# Patient Record
Sex: FEMALE | Race: WHITE | Employment: UNEMPLOYED | URBAN - METROPOLITAN AREA
[De-identification: names, ages, dates, MRNs, and addresses within clinical notes are randomized per-mention and may not be internally consistent; named-entity substitution may affect disease eponyms.]

---

## 2017-04-24 ENCOUNTER — OFFICE VISIT (OUTPATIENT)
Dept: FAMILY MEDICINE CLINIC | Age: 37
End: 2017-04-24
Payer: MEDICAID

## 2017-04-24 ENCOUNTER — HOSPITAL ENCOUNTER (OUTPATIENT)
Age: 37
Setting detail: SPECIMEN
Discharge: HOME | End: 2017-04-24
Payer: MEDICAID

## 2017-04-24 VITALS
BODY MASS INDEX: 41.15 KG/M2 | WEIGHT: 247 LBS | HEIGHT: 65 IN | RESPIRATION RATE: 16 BRPM | TEMPERATURE: 97.7 F | HEART RATE: 96 BPM | DIASTOLIC BLOOD PRESSURE: 86 MMHG | SYSTOLIC BLOOD PRESSURE: 118 MMHG

## 2017-04-24 DIAGNOSIS — Z01.419 ENCOUNTER FOR GYNECOLOGICAL EXAMINATION WITHOUT ABNORMAL FINDING: ICD-10-CM

## 2017-04-24 DIAGNOSIS — Z00.00 ROUTINE PHYSICAL EXAMINATION: Primary | ICD-10-CM

## 2017-04-24 DIAGNOSIS — Z11.4 SCREENING FOR HIV (HUMAN IMMUNODEFICIENCY VIRUS): ICD-10-CM

## 2017-04-24 PROCEDURE — 99395 PREV VISIT EST AGE 18-39: CPT | Performed by: NURSE PRACTITIONER

## 2017-04-24 RX ORDER — CHOLECALCIFEROL (VITAMIN D3) 125 MCG
5 CAPSULE ORAL NIGHTLY PRN
COMMUNITY
Start: 2017-04-24 | End: 2018-08-17 | Stop reason: ALTCHOICE

## 2017-04-24 RX ORDER — BUTALBITAL, ACETAMINOPHEN AND CAFFEINE 300; 40; 50 MG/1; MG/1; MG/1
1 CAPSULE ORAL PRN
COMMUNITY
Start: 2017-04-24 | End: 2021-07-21

## 2017-04-24 RX ORDER — FLUOXETINE HYDROCHLORIDE 40 MG/1
1 CAPSULE ORAL DAILY
Refills: 0 | COMMUNITY
Start: 2017-03-27 | End: 2018-08-17 | Stop reason: DRUGHIGH

## 2017-04-24 ASSESSMENT — ENCOUNTER SYMPTOMS
RHINORRHEA: 0
COUGH: 0
VOMITING: 0
DIARRHEA: 0
EYE DISCHARGE: 0
EYE REDNESS: 0
ABDOMINAL PAIN: 0
CHEST TIGHTNESS: 0
WHEEZING: 0
SHORTNESS OF BREATH: 0
SINUS PRESSURE: 0
BACK PAIN: 1
CONSTIPATION: 0

## 2017-04-26 LAB
HPV SAMPLE: NORMAL
HPV SOURCE: NORMAL
HPV, GENOTYPE 16: NOT DETECTED
HPV, GENOTYPE 18: NOT DETECTED
HPV, HIGH RISK OTHER: NOT DETECTED
HPV, INTERPRETATION: NORMAL

## 2017-04-28 LAB — CYTOLOGY REPORT: NORMAL

## 2017-09-08 ENCOUNTER — OFFICE VISIT (OUTPATIENT)
Dept: FAMILY MEDICINE CLINIC | Age: 37
End: 2017-09-08
Payer: MEDICAID

## 2017-09-08 VITALS
SYSTOLIC BLOOD PRESSURE: 114 MMHG | DIASTOLIC BLOOD PRESSURE: 72 MMHG | RESPIRATION RATE: 16 BRPM | TEMPERATURE: 99 F | HEART RATE: 100 BPM | BODY MASS INDEX: 42.93 KG/M2 | WEIGHT: 254 LBS

## 2017-09-08 DIAGNOSIS — J40 BRONCHITIS: Primary | ICD-10-CM

## 2017-09-08 PROBLEM — M50.20 HERNIATION OF INTERVERTEBRAL DISC OF CERVICAL REGION: Status: ACTIVE | Noted: 2017-08-18

## 2017-09-08 PROCEDURE — 99213 OFFICE O/P EST LOW 20 MIN: CPT | Performed by: NURSE PRACTITIONER

## 2017-09-08 RX ORDER — AZITHROMYCIN 250 MG/1
TABLET, FILM COATED ORAL
Qty: 6 TABLET | Refills: 0 | Status: SHIPPED | OUTPATIENT
Start: 2017-09-08 | End: 2017-09-18

## 2017-09-08 RX ORDER — PREDNISONE 20 MG/1
TABLET ORAL
Refills: 0 | COMMUNITY
Start: 2017-09-05 | End: 2017-09-11

## 2017-09-08 RX ORDER — IBUPROFEN 800 MG/1
1 TABLET ORAL EVERY 6 HOURS PRN
Refills: 0 | COMMUNITY
Start: 2017-09-05 | End: 2018-09-08

## 2017-09-08 RX ORDER — CYCLOBENZAPRINE HCL 10 MG
1 TABLET ORAL 3 TIMES DAILY PRN
Refills: 0 | COMMUNITY
Start: 2017-06-30 | End: 2018-05-08 | Stop reason: ALTCHOICE

## 2017-09-08 ASSESSMENT — ENCOUNTER SYMPTOMS
SHORTNESS OF BREATH: 0
SINUS PRESSURE: 1
ABDOMINAL PAIN: 0
NAUSEA: 0
CONSTIPATION: 0
COUGH: 1
RHINORRHEA: 1
WHEEZING: 0
EYES NEGATIVE: 1
VOICE CHANGE: 1
SORE THROAT: 1
TROUBLE SWALLOWING: 1
DIARRHEA: 0

## 2017-10-13 ENCOUNTER — OFFICE VISIT (OUTPATIENT)
Dept: FAMILY MEDICINE CLINIC | Age: 37
End: 2017-10-13
Payer: MEDICAID

## 2017-10-13 VITALS
RESPIRATION RATE: 16 BRPM | SYSTOLIC BLOOD PRESSURE: 110 MMHG | WEIGHT: 253 LBS | TEMPERATURE: 98 F | HEART RATE: 84 BPM | BODY MASS INDEX: 42.76 KG/M2 | DIASTOLIC BLOOD PRESSURE: 76 MMHG

## 2017-10-13 DIAGNOSIS — L30.9 DERMATITIS: Primary | ICD-10-CM

## 2017-10-13 PROCEDURE — 99213 OFFICE O/P EST LOW 20 MIN: CPT | Performed by: NURSE PRACTITIONER

## 2017-10-13 PROCEDURE — G8427 DOCREV CUR MEDS BY ELIG CLIN: HCPCS | Performed by: NURSE PRACTITIONER

## 2017-10-13 PROCEDURE — G8417 CALC BMI ABV UP PARAM F/U: HCPCS | Performed by: NURSE PRACTITIONER

## 2017-10-13 PROCEDURE — G8484 FLU IMMUNIZE NO ADMIN: HCPCS | Performed by: NURSE PRACTITIONER

## 2017-10-13 PROCEDURE — 1036F TOBACCO NON-USER: CPT | Performed by: NURSE PRACTITIONER

## 2017-10-13 RX ORDER — TRIAMCINOLONE ACETONIDE 5 MG/G
OINTMENT TOPICAL 2 TIMES DAILY
COMMUNITY
End: 2020-09-04

## 2017-10-13 RX ORDER — PREDNISONE 20 MG/1
TABLET ORAL
Qty: 21 TABLET | Refills: 0 | Status: SHIPPED | OUTPATIENT
Start: 2017-10-13 | End: 2017-10-23

## 2017-10-13 ASSESSMENT — ENCOUNTER SYMPTOMS
SHORTNESS OF BREATH: 0
SORE THROAT: 0
RHINORRHEA: 0
EYE PAIN: 0
DIARRHEA: 0

## 2017-10-13 NOTE — PROGRESS NOTES
5 MG tablet Take 1 tablet by mouth nightly as needed for Sleep 30 tablet 0    omeprazole (PRILOSEC) 20 MG capsule Take 1 capsule by mouth Daily 30 capsule 3    Calcium Carbonate-Vitamin D (CALCIUM-VITAMIN D) 500-200 MG-UNIT per tablet Take 1 tablet by mouth 2 times daily (with meals). No current facility-administered medications for this visit. Rash   This is a new problem. The current episode started 1 to 4 weeks ago. The problem has been gradually worsening since onset. The affected locations include the face, torso, chest, back and neck. The rash is characterized by dryness. Pertinent negatives include no congestion, cough, diarrhea, eye pain, fatigue, fever, joint pain, rhinorrhea, shortness of breath, sore throat or vomiting. Past treatments include anti-itch cream, topical steroids and oral steroids. The treatment provided no relief. There is no history of allergies, eczema or varicella. Presents to the office with concerns regarding rash that started several weeks ago. Patient was evaluated in an emergency department, and was prescribed steroids. States that she did complete his course of steroids, symptoms greatly improved. States that a couple of days after stopping the steroid symptoms returned. Rash is pruritic in nature. Denies any drainage or discharge. Denies any fevers or chills. No shortness of breath or difficulty breathing. j    Review of Systems   Constitutional: Negative for chills, fatigue, fever and unexpected weight change. HENT: Negative for congestion, ear pain, postnasal drip, rhinorrhea and sore throat. Eyes: Negative for pain. Respiratory: Negative for cough, chest tightness, shortness of breath and wheezing. Cardiovascular: Negative for chest pain and palpitations. Gastrointestinal: Negative for abdominal pain, constipation, diarrhea, nausea and vomiting. Endocrine: Negative for polydipsia and polyuria.    Genitourinary: Negative for dysuria, frequency, hematuria and urgency. Musculoskeletal: Negative for back pain, gait problem, joint pain, myalgias and neck stiffness. Skin: Positive for rash. Negative for wound. Allergic/Immunologic: Positive for food allergies (shellfish). Negative for environmental allergies. Zinc   Neurological: Negative for dizziness, seizures, syncope, weakness and headaches. Hematological: Negative for adenopathy. Psychiatric/Behavioral: Negative for dysphoric mood, self-injury and suicidal ideas. The patient is not nervous/anxious. Objective:   Physical Exam   Constitutional: She is oriented to person, place, and time. She appears well-developed. Non-toxic appearance. She does not appear ill. No distress. Obese   HENT:   Head: Normocephalic and atraumatic. Right Ear: Hearing and external ear normal.   Left Ear: Hearing and external ear normal.   Nose: Nose normal.   Mouth/Throat: Oropharynx is clear and moist.   Eyes: Conjunctivae and EOM are normal.   Neck: Neck supple. No tracheal deviation present. Cardiovascular: Normal rate and normal heart sounds. Pulses:       Radial pulses are 2+ on the right side, and 2+ on the left side. Pulmonary/Chest: Effort normal and breath sounds normal. No stridor. No respiratory distress. She has no decreased breath sounds. She has no wheezes. She has no rhonchi. She has no rales. Abdominal: Soft. Normal appearance. She exhibits no distension. Neurological: She is alert and oriented to person, place, and time. GCS eye subscore is 4. GCS verbal subscore is 5. GCS motor subscore is 6. Skin: Skin is warm, dry and intact. Rash noted. Rash is urticarial (present to bilateral arms, upper chest). She is not diaphoretic. Psychiatric: She has a normal mood and affect. Her speech is normal and behavior is normal. Judgment and thought content normal. Cognition and memory are normal.       Assessment:      1.  Dermatitis  predniSONE (DELTASONE) 20 MG tablet Plan:      Proceed with a 10 day taper of prednisone. Continue antihistamine as needed for relief of itching. Encouraged plenty of fluids and rest.  Monitor for worsening symptoms. Maintain follow up as planned. Recommend influenza vaccine in the next month. Call office with any concerns. Return if symptoms worsen or fail to improve, for dermatitis. Lore Araujo received counseling on the following healthy behaviors: nutrition, exercise and medication adherence  Reviewed prior labs and health maintenance. Continue current medications, diet and exercise. Discussed use, benefit, and side effects of prescribed medications. Barriers to medication compliance addressed. Patient given educational materials - see patient instructions. All patient questions answered. Patient voiced understanding.

## 2017-10-22 ASSESSMENT — ENCOUNTER SYMPTOMS
VOMITING: 0
COUGH: 0
ALLERGIC/IMMUNOLOGIC COMMENTS: ZINC
ABDOMINAL PAIN: 0
BACK PAIN: 0
CHEST TIGHTNESS: 0
WHEEZING: 0
CONSTIPATION: 0
NAUSEA: 0

## 2017-10-23 ENCOUNTER — HOSPITAL ENCOUNTER (OUTPATIENT)
Age: 37
Setting detail: SPECIMEN
Discharge: HOME | End: 2017-10-23
Payer: MEDICAID

## 2017-10-23 ENCOUNTER — TELEPHONE (OUTPATIENT)
Dept: FAMILY MEDICINE CLINIC | Age: 37
End: 2017-10-23

## 2017-10-23 DIAGNOSIS — Z11.4 SCREENING FOR HIV (HUMAN IMMUNODEFICIENCY VIRUS): ICD-10-CM

## 2017-10-23 DIAGNOSIS — L30.9 DERMATITIS: Primary | ICD-10-CM

## 2017-10-23 DIAGNOSIS — Z00.00 ROUTINE PHYSICAL EXAMINATION: ICD-10-CM

## 2017-10-23 LAB
ALBUMIN SERPL-MCNC: 3.4 G/DL (ref 3.5–5.2)
ALBUMIN/GLOBULIN RATIO: 1.1 (ref 1–2.5)
ALP BLD-CCNC: 75 U/L (ref 35–104)
ALT SERPL-CCNC: 13 U/L (ref 5–33)
ANION GAP SERPL CALCULATED.3IONS-SCNC: 14 MMOL/L (ref 9–17)
AST SERPL-CCNC: 10 U/L
BILIRUB SERPL-MCNC: 0.19 MG/DL (ref 0.3–1.2)
BUN BLDV-MCNC: 12 MG/DL (ref 6–20)
BUN/CREAT BLD: ABNORMAL (ref 9–20)
CALCIUM SERPL-MCNC: 8.8 MG/DL (ref 8.6–10.4)
CHLORIDE BLD-SCNC: 102 MMOL/L (ref 98–107)
CHOLESTEROL/HDL RATIO: 3.7
CHOLESTEROL: 193 MG/DL
CO2: 25 MMOL/L (ref 20–31)
CREAT SERPL-MCNC: 0.71 MG/DL (ref 0.5–0.9)
GFR AFRICAN AMERICAN: >60 ML/MIN
GFR NON-AFRICAN AMERICAN: >60 ML/MIN
GFR SERPL CREATININE-BSD FRML MDRD: ABNORMAL ML/MIN/{1.73_M2}
GFR SERPL CREATININE-BSD FRML MDRD: ABNORMAL ML/MIN/{1.73_M2}
GLUCOSE BLD-MCNC: 79 MG/DL (ref 70–99)
HCT VFR BLD CALC: 41.8 % (ref 36–46)
HDLC SERPL-MCNC: 52 MG/DL
HEMOGLOBIN: 13.6 G/DL (ref 12–16)
HIV AG/AB: NONREACTIVE
LDL CHOLESTEROL: 116 MG/DL (ref 0–130)
MCH RBC QN AUTO: 28.5 PG (ref 26–34)
MCHC RBC AUTO-ENTMCNC: 32.5 G/DL (ref 31–37)
MCV RBC AUTO: 87.7 FL (ref 80–100)
PDW BLD-RTO: 16.3 % (ref 12.5–15.4)
PLATELET # BLD: 302 K/UL (ref 140–450)
PMV BLD AUTO: 7.2 FL (ref 6–12)
POTASSIUM SERPL-SCNC: 4.1 MMOL/L (ref 3.7–5.3)
RBC # BLD: 4.76 M/UL (ref 4–5.2)
SODIUM BLD-SCNC: 141 MMOL/L (ref 135–144)
TOTAL PROTEIN: 6.6 G/DL (ref 6.4–8.3)
TRIGL SERPL-MCNC: 126 MG/DL
VLDLC SERPL CALC-MCNC: NORMAL MG/DL (ref 1–30)
WBC # BLD: 19.2 K/UL (ref 3.5–11)

## 2017-10-24 ENCOUNTER — OFFICE VISIT (OUTPATIENT)
Dept: DERMATOLOGY | Age: 37
End: 2017-10-24
Payer: MEDICAID

## 2017-10-24 VITALS
WEIGHT: 253.8 LBS | DIASTOLIC BLOOD PRESSURE: 76 MMHG | BODY MASS INDEX: 40.79 KG/M2 | OXYGEN SATURATION: 95 % | SYSTOLIC BLOOD PRESSURE: 113 MMHG | HEART RATE: 111 BPM | HEIGHT: 66 IN

## 2017-10-24 DIAGNOSIS — R21 RASH: Primary | ICD-10-CM

## 2017-10-24 PROCEDURE — G8484 FLU IMMUNIZE NO ADMIN: HCPCS | Performed by: DERMATOLOGY

## 2017-10-24 PROCEDURE — G8417 CALC BMI ABV UP PARAM F/U: HCPCS | Performed by: DERMATOLOGY

## 2017-10-24 PROCEDURE — G8427 DOCREV CUR MEDS BY ELIG CLIN: HCPCS | Performed by: DERMATOLOGY

## 2017-10-24 PROCEDURE — 1036F TOBACCO NON-USER: CPT | Performed by: DERMATOLOGY

## 2017-10-24 PROCEDURE — 99202 OFFICE O/P NEW SF 15 MIN: CPT | Performed by: DERMATOLOGY

## 2017-10-24 RX ORDER — DOXYCYCLINE HYCLATE 100 MG/1
CAPSULE ORAL
Qty: 60 CAPSULE | Refills: 2 | Status: SHIPPED | OUTPATIENT
Start: 2017-10-24 | End: 2017-11-21 | Stop reason: ALTCHOICE

## 2017-10-24 RX ORDER — CLINDAMYCIN PHOSPHATE 10 UG/ML
LOTION TOPICAL
Qty: 60 ML | Refills: 3 | Status: SHIPPED | OUTPATIENT
Start: 2017-10-24 | End: 2020-09-04

## 2017-10-24 RX ORDER — KETOCONAZOLE 20 MG/ML
SHAMPOO TOPICAL
Qty: 120 ML | Refills: 3 | Status: SHIPPED | OUTPATIENT
Start: 2017-10-24 | End: 2017-11-21 | Stop reason: ALTCHOICE

## 2017-10-24 NOTE — PROGRESS NOTES
Findings:  Physical Exam   Skin:            Medical Necessity of Exam Performed:   Distribution of patient concerns    Additional Diagnostic Testing performed during exam: Not performed ,  Not performed    ASSESSMENT:  1. Rash         Plan of Action is as Follows:  Assessment 1. Rash  Rash exam today is most consistent with folliculitis more specifically it appears consistent with steroid folliculitis. I discussed that it is possible that she had a different rash the prednisone cleared up but it triggered steroid folliculitis. I discussed that sometimes acneiform or folliculitis-type rashes can occur with new medications that she denies any new medications or new exposures. Start Doxycycline 100 mg twice daily  Start ketoconazole shampoo as a body wash the scalp face chest and back leaving off her findings prior to washing off daily  Start clindamycin lotion to the patient's chest and back 1-2 times daily  Follow up in 4 weeks if rash not completely resolved with consider biopsying 4 weeks. Patient Instructions   1. Doxycycline with food in morning and in evening. Use sunscreen while taking this medication. 2. Ketoconazole wash to face, chest and back daily - leave on for five minutes prior to washing off.  3. Clindamycin lotion to rash. 4. Return in four weeks, call if symptoms worsen before follow up appointment. Photo surveillance performed: No    Follow-up: 4 weeks    This note was created with the assistance of a speech-recognition program.  Although the intention is to generate a document that actually reflects the content of the visit, no guarantees can be provided that every mistake has been identified and corrected by editing.     Electronically signed by Sunshine Caldwell MD on 10/24/17 at 1:01 PM

## 2017-11-01 ENCOUNTER — HOSPITAL ENCOUNTER (OUTPATIENT)
Age: 37
Setting detail: SPECIMEN
Discharge: HOME | End: 2017-11-01
Payer: MEDICAID

## 2017-11-01 ENCOUNTER — OFFICE VISIT (OUTPATIENT)
Dept: DERMATOLOGY | Age: 37
End: 2017-11-01
Payer: MEDICAID

## 2017-11-01 VITALS
HEART RATE: 102 BPM | HEIGHT: 66 IN | BODY MASS INDEX: 40.66 KG/M2 | OXYGEN SATURATION: 96 % | WEIGHT: 253 LBS | DIASTOLIC BLOOD PRESSURE: 83 MMHG | SYSTOLIC BLOOD PRESSURE: 124 MMHG

## 2017-11-01 DIAGNOSIS — R21 RASH: Primary | ICD-10-CM

## 2017-11-01 PROCEDURE — G8484 FLU IMMUNIZE NO ADMIN: HCPCS | Performed by: DERMATOLOGY

## 2017-11-01 PROCEDURE — G8417 CALC BMI ABV UP PARAM F/U: HCPCS | Performed by: DERMATOLOGY

## 2017-11-01 PROCEDURE — 11100 PR BIOPSY OF SKIN LESION: CPT | Performed by: DERMATOLOGY

## 2017-11-01 PROCEDURE — 1036F TOBACCO NON-USER: CPT | Performed by: DERMATOLOGY

## 2017-11-01 PROCEDURE — G8427 DOCREV CUR MEDS BY ELIG CLIN: HCPCS | Performed by: DERMATOLOGY

## 2017-11-01 PROCEDURE — 99213 OFFICE O/P EST LOW 20 MIN: CPT | Performed by: DERMATOLOGY

## 2017-11-01 RX ORDER — FLUOCINONIDE 0.5 MG/G
OINTMENT TOPICAL
Qty: 30 G | Refills: 2 | Status: SHIPPED | OUTPATIENT
Start: 2017-11-01 | End: 2020-09-04

## 2017-11-01 RX ORDER — LIDOCAINE HYDROCHLORIDE AND EPINEPHRINE 10; 10 MG/ML; UG/ML
0.5 INJECTION, SOLUTION INFILTRATION; PERINEURAL ONCE
Status: COMPLETED | OUTPATIENT
Start: 2017-11-01 | End: 2017-11-01

## 2017-11-01 RX ADMIN — LIDOCAINE HYDROCHLORIDE AND EPINEPHRINE 0.5 ML: 10; 10 INJECTION, SOLUTION INFILTRATION; PERINEURAL at 14:58

## 2017-11-01 NOTE — PATIENT INSTRUCTIONS

## 2017-11-01 NOTE — PROGRESS NOTES
Dermatology Patient Note  [address]  [address] [place] [address] [address]  Dept: [phone]  Dept Fax: [phone]      VISIT DATE: 11/1/2017   REFERRING PROVIDER: No ref. provider found      Chente Carranza is a 40 y.o. female  who presents today in the office for:    Rash (rash is spreading and getting worse--neck feels hot, face is swollen. Only slight itch around hairline.)      HISTORY OF PRESENT ILLNESS:  HPI Rash Followup:    Gabrielle Crowell was seen today for follow-up evaluation of Rash    Interim Course: Worsening    Areas of Involvement: face, neck and chest    Associated Symptoms: Itching    Exacerbating Factors: none    Current Medications for this Rash:  clinamycin, BPO wash, doxycycline     Rash Treatment Compliance:  Using all Topical and Systemic Medications as Prescribed at Last Visit    Side Effects from Treatments: None    Interim  Evaluation: None                CURRENT MEDICATIONS:   Current Outpatient Prescriptions   Medication Sig Dispense Refill    fluocinonide (LIDEX) 0.05 % ointment Apply thin layer to rash on neck and chest (not face, axilla or groin) 30 g 2    doxycycline hyclate (VIBRAMYCIN) 100 MG capsule Take 1 pill twice daily with food 60 capsule 2    clindamycin (CLEOCIN T) 1 % lotion Apply to face, chest and back daily 60 mL 3    ketoconazole (NIZORAL) 2 % shampoo Use as body wash leaving on for 5 minutes prior to washing off once daily for 2 weeks then three times weekly 120 mL 3    triamcinolone (ARISTOCORT) 0.5 % ointment Apply topically 2 times daily Apply topically 2 times daily.       cyclobenzaprine (FLEXERIL) 10 MG tablet Take 1 tablet by mouth 3 times daily as needed  0    ibuprofen (ADVIL;MOTRIN) 800 MG tablet Take 1 tablet by mouth every 6 hours as needed  0    FLUoxetine (PROZAC) 40 MG capsule Take 1 capsule by mouth daily  0    topiramate (TOPAMAX) 200 MG tablet Take 1 tablet by mouth 2 times daily  4    melatonin 5 MG TABS tablet Take 5 mg by mouth nightly as needed      Lysine 500 MG CAPS Take 1 capsule by mouth daily      butalbital-APAP-caffeine (FIORICET) -40 MG CAPS per capsule Take 1 capsule by mouth as needed for Headaches      zolpidem (AMBIEN) 5 MG tablet Take 1 tablet by mouth nightly as needed for Sleep 30 tablet 0    omeprazole (PRILOSEC) 20 MG capsule Take 1 capsule by mouth Daily 30 capsule 3    Calcium Carbonate-Vitamin D (CALCIUM-VITAMIN D) 500-200 MG-UNIT per tablet Take 1 tablet by mouth 2 times daily (with meals). Current Facility-Administered Medications   Medication Dose Route Frequency Provider Last Rate Last Dose    lidocaine-EPINEPHrine 1 [id number] injection 0.5 mL  0.5 mL Intradermal Once Clearence MD Raisa           ALLERGIES:   Allergies   Allergen Reactions    Shellfish-Derived Products Anaphylaxis    Zinc Nausea And Vomiting       SOCIAL HISTORY:  Social History   Substance Use Topics    Smoking status: Never Smoker    Smokeless tobacco: Never Used    Alcohol use 0.0 oz/week      Comment: rarely       REVIEW OF SYSTEMS:  Review of Systems   Constitutional: Negative. Skin: Denies any new changing, growing or bleeding lesions or rashes except as described in the HPI     PHYSICAL EXAM:   /83 (Site: Right Arm, Position: Sitting, Cuff Size: Large Adult)   Pulse 102   Ht 5' 6\" (1.676 m)   Wt 253 lb (114.8 kg)   LMP 10/23/2017 (Exact Date)   SpO2 96%   BMI 40.84 kg/m²     General Exam:  General Appearance: No acute distress, Well nourished     Neuro: Alert  Psych: Not Performed   Lymph Node: Not performed    Cutaneous Exam: Performed as documented in clinic note below. Waist-up skin, which includes the head/face, neck, both arms, chest, back, abdomen, digits and/or nails, was examined.     Pertinent Physical Exam Findings:  Physical Exam   Skin:   Erythematous macules with central pustules on the face, neck and upper back       Medical Necessity of Exam Although the intention is to generate a document that actually reflects the content of the visit, no guarantees can be provided that every mistake has been identified and corrected by editing.     Electronically signed by Lisa Waddell MD on 11/1/17 at 2:55 PM

## 2017-11-03 LAB — DERMATOLOGY PATHOLOGY REPORT: NORMAL

## 2017-11-08 ENCOUNTER — NURSE ONLY (OUTPATIENT)
Dept: DERMATOLOGY | Age: 37
End: 2017-11-08

## 2017-11-08 DIAGNOSIS — L73.9 FOLLICULITIS: Primary | ICD-10-CM

## 2017-11-21 ENCOUNTER — OFFICE VISIT (OUTPATIENT)
Dept: DERMATOLOGY | Age: 37
End: 2017-11-21
Payer: MEDICAID

## 2017-11-21 VITALS
HEIGHT: 66 IN | BODY MASS INDEX: 41.3 KG/M2 | SYSTOLIC BLOOD PRESSURE: 117 MMHG | WEIGHT: 257 LBS | HEART RATE: 89 BPM | DIASTOLIC BLOOD PRESSURE: 77 MMHG | OXYGEN SATURATION: 96 %

## 2017-11-21 DIAGNOSIS — L73.9 FOLLICULITIS: Primary | ICD-10-CM

## 2017-11-21 PROCEDURE — G8484 FLU IMMUNIZE NO ADMIN: HCPCS | Performed by: DERMATOLOGY

## 2017-11-21 PROCEDURE — G8417 CALC BMI ABV UP PARAM F/U: HCPCS | Performed by: DERMATOLOGY

## 2017-11-21 PROCEDURE — G8428 CUR MEDS NOT DOCUMENT: HCPCS | Performed by: DERMATOLOGY

## 2017-11-21 PROCEDURE — 1036F TOBACCO NON-USER: CPT | Performed by: DERMATOLOGY

## 2017-11-21 PROCEDURE — 99213 OFFICE O/P EST LOW 20 MIN: CPT | Performed by: DERMATOLOGY

## 2017-11-21 NOTE — PATIENT INSTRUCTIONS
1. Stop doxycycline. 2. Continue topical treatments until rash is gone.   3. Follow up with allergy referral: Dr. Sundar Hooper [phone]

## 2017-11-21 NOTE — PROGRESS NOTES
Take 1 tablet by mouth nightly as needed for Sleep 30 tablet 0    omeprazole (PRILOSEC) 20 MG capsule Take 1 capsule by mouth Daily 30 capsule 3    Calcium Carbonate-Vitamin D (CALCIUM-VITAMIN D) 500-200 MG-UNIT per tablet Take 1 tablet by mouth 2 times daily (with meals). No current facility-administered medications for this visit. ALLERGIES:   Allergies   Allergen Reactions    Shellfish-Derived Products Anaphylaxis    Zinc Nausea And Vomiting       SOCIAL HISTORY:  Social History   Substance Use Topics    Smoking status: Never Smoker    Smokeless tobacco: Never Used    Alcohol use 0.0 oz/week      Comment: rarely       REVIEW OF SYSTEMS:  Review of Systems   Constitutional: Negative. Skin: Denies any new changing, growing or bleeding lesions or rashes except as described in the HPI     PHYSICAL EXAM:   /77 (Site: Left Arm, Position: Sitting, Cuff Size: Large Adult)   Pulse 89   Ht 5' 5.5\" (1.664 m)   Wt 257 lb (116.6 kg)   LMP 11/18/2017 (Exact Date)   SpO2 96%   BMI 42.12 kg/m²     General Exam:  General Appearance: No acute distress, Well nourished     Neuro: Alert  Psych: Not Performed   Lymph Node: Not performed    Cutaneous Exam: Performed as documented in clinic note below. Waist-up skin, which includes the head/face, neck, both arms, chest, back, abdomen, digits and/or nails, was examined. Pertinent Physical Exam Findings:  Physical Exam   Skin:            Medical Necessity of Exam Performed:   Distribution of patient concerns    Additional Diagnostic Testing performed during exam: Not performed ,  Not performed    ASSESSMENT:  1. Folliculitis         Plan of Action is as Follows:  Assessment 1.  Folliculitis  Improving  - Stop doxycycline  - Continue clindamycin and fluocinonide til rash resolved then stop both  - Call if not fully resolved in 4 weeks  - Follow up with allergy for assessment of potential reaction to medication fillers/dyes          Patient

## 2018-05-08 ENCOUNTER — HOSPITAL ENCOUNTER (OUTPATIENT)
Age: 38
Setting detail: SPECIMEN
Discharge: HOME | End: 2018-05-08
Payer: MEDICAID

## 2018-05-08 ENCOUNTER — OFFICE VISIT (OUTPATIENT)
Dept: FAMILY MEDICINE CLINIC | Age: 38
End: 2018-05-08
Payer: MEDICAID

## 2018-05-08 VITALS
DIASTOLIC BLOOD PRESSURE: 72 MMHG | HEART RATE: 76 BPM | WEIGHT: 243 LBS | SYSTOLIC BLOOD PRESSURE: 120 MMHG | RESPIRATION RATE: 20 BRPM | BODY MASS INDEX: 39.82 KG/M2 | TEMPERATURE: 98 F

## 2018-05-08 DIAGNOSIS — Z12.4 PAP SMEAR FOR CERVICAL CANCER SCREENING: Primary | ICD-10-CM

## 2018-05-08 DIAGNOSIS — Z00.00 PHYSICAL EXAM, ANNUAL: ICD-10-CM

## 2018-05-08 DIAGNOSIS — Z72.51 UNPROTECTED SEX: ICD-10-CM

## 2018-05-08 DIAGNOSIS — E88.81 INSULIN RESISTANCE: Chronic | ICD-10-CM

## 2018-05-08 PROBLEM — R46.89 COGNITIVE AND BEHAVIORAL CHANGES: Status: ACTIVE | Noted: 2018-02-13

## 2018-05-08 PROBLEM — S09.90XS HEADACHES DUE TO OLD HEAD INJURY: Status: ACTIVE | Noted: 2018-02-13

## 2018-05-08 PROBLEM — H53.9 VISUAL CHANGES: Status: ACTIVE | Noted: 2018-02-13

## 2018-05-08 PROBLEM — G44.309 HEADACHES DUE TO OLD HEAD INJURY: Status: ACTIVE | Noted: 2018-02-13

## 2018-05-08 PROBLEM — R41.89 COGNITIVE AND BEHAVIORAL CHANGES: Status: ACTIVE | Noted: 2018-02-13

## 2018-05-08 PROBLEM — G44.209 MUSCLE CONTRACTION HEADACHE: Status: ACTIVE | Noted: 2018-04-11

## 2018-05-08 PROBLEM — F07.81 POST CONCUSSION SYNDROME: Status: ACTIVE | Noted: 2018-02-13

## 2018-05-08 PROBLEM — Z87.820 HISTORY OF CONCUSSION: Status: ACTIVE | Noted: 2018-02-13

## 2018-05-08 LAB
ABSOLUTE EOS #: 0.11 K/UL (ref 0–0.44)
ABSOLUTE IMMATURE GRANULOCYTE: 0.03 K/UL (ref 0–0.3)
ABSOLUTE LYMPH #: 3.31 K/UL (ref 1.1–3.7)
ABSOLUTE MONO #: 0.53 K/UL (ref 0.1–1.2)
BASOPHILS # BLD: 0 % (ref 0–2)
BASOPHILS ABSOLUTE: 0.03 K/UL (ref 0–0.2)
C-REACTIVE PROTEIN: 9.3 MG/L (ref 0–5)
DIFFERENTIAL TYPE: ABNORMAL
EOSINOPHILS RELATIVE PERCENT: 1 % (ref 1–4)
HCT VFR BLD CALC: 40.7 % (ref 36.3–47.1)
HEMOGLOBIN: 12.4 G/DL (ref 11.9–15.1)
IGE: 11 IU/ML
IMMATURE GRANULOCYTES: 0 %
LYMPHOCYTES # BLD: 32 % (ref 24–43)
MCH RBC QN AUTO: 27.3 PG (ref 25.2–33.5)
MCHC RBC AUTO-ENTMCNC: 30.5 G/DL (ref 28.4–34.8)
MCV RBC AUTO: 89.6 FL (ref 82.6–102.9)
MONOCYTES # BLD: 5 % (ref 3–12)
NRBC AUTOMATED: 0 PER 100 WBC
PDW BLD-RTO: 15.1 % (ref 11.8–14.4)
PLATELET # BLD: 295 K/UL (ref 138–453)
PLATELET ESTIMATE: ABNORMAL
PMV BLD AUTO: 10.5 FL (ref 8.1–13.5)
RBC # BLD: 4.54 M/UL (ref 3.95–5.11)
RBC # BLD: ABNORMAL 10*6/UL
SEDIMENTATION RATE, ERYTHROCYTE: 10 MM (ref 0–20)
SEG NEUTROPHILS: 62 % (ref 36–65)
SEGMENTED NEUTROPHILS ABSOLUTE COUNT: 6.38 K/UL (ref 1.5–8.1)
WBC # BLD: 10.4 K/UL (ref 3.5–11.3)
WBC # BLD: ABNORMAL 10*3/UL

## 2018-05-08 PROCEDURE — 99395 PREV VISIT EST AGE 18-39: CPT | Performed by: NURSE PRACTITIONER

## 2018-05-08 RX ORDER — TRAZODONE HYDROCHLORIDE 50 MG/1
100 TABLET ORAL NIGHTLY
Refills: 0 | COMMUNITY
Start: 2018-04-11

## 2018-05-08 ASSESSMENT — ENCOUNTER SYMPTOMS
EYES NEGATIVE: 1
NAUSEA: 0
COUGH: 0
ABDOMINAL PAIN: 0
SHORTNESS OF BREATH: 0
SINUS PAIN: 0
VOMITING: 0
WHEEZING: 0
CONSTIPATION: 0
BACK PAIN: 0
DIARRHEA: 0
SORE THROAT: 0

## 2018-05-08 ASSESSMENT — PATIENT HEALTH QUESTIONNAIRE - PHQ9
SUM OF ALL RESPONSES TO PHQ QUESTIONS 1-9: 0
SUM OF ALL RESPONSES TO PHQ9 QUESTIONS 1 & 2: 0
2. FEELING DOWN, DEPRESSED OR HOPELESS: 0
1. LITTLE INTEREST OR PLEASURE IN DOING THINGS: 0

## 2018-05-09 LAB
ANTI-NUCLEAR ANTIBODY (ANA): POSITIVE
CHLAMYDIA BY THIN PREP: NEGATIVE
DIRECT EXAM: NORMAL
Lab: NORMAL
N. GONORRHOEAE DNA, THIN PREP: NEGATIVE
SPECIMEN DESCRIPTION: NORMAL
STATUS: NORMAL

## 2018-05-15 ENCOUNTER — OFFICE VISIT (OUTPATIENT)
Dept: OBGYN CLINIC | Age: 38
End: 2018-05-15
Payer: MEDICAID

## 2018-05-15 VITALS — BODY MASS INDEX: 39.65 KG/M2 | HEIGHT: 65 IN | WEIGHT: 238 LBS

## 2018-05-15 DIAGNOSIS — Z30.017 INSERTION OF NEXPLANON: ICD-10-CM

## 2018-05-15 DIAGNOSIS — Z30.017 ENCOUNTER FOR INITIAL PRESCRIPTION OF IMPLANTABLE SUBDERMAL CONTRACEPTIVE: Primary | ICD-10-CM

## 2018-05-15 DIAGNOSIS — N94.6 DYSMENORRHEA: ICD-10-CM

## 2018-05-15 LAB
CONTROL: PRESENT
PREGNANCY TEST URINE, POC: NEGATIVE

## 2018-05-15 PROCEDURE — 81025 URINE PREGNANCY TEST: CPT | Performed by: ADVANCED PRACTICE MIDWIFE

## 2018-05-15 PROCEDURE — 11981 INSERTION DRUG DLVR IMPLANT: CPT | Performed by: ADVANCED PRACTICE MIDWIFE

## 2018-05-16 DIAGNOSIS — R76.8 POSITIVE ANA (ANTINUCLEAR ANTIBODY): Primary | ICD-10-CM

## 2018-05-23 ENCOUNTER — HOSPITAL ENCOUNTER (OUTPATIENT)
Age: 38
Setting detail: SPECIMEN
Discharge: HOME | End: 2018-05-23
Payer: MEDICAID

## 2018-05-23 DIAGNOSIS — Z00.00 PHYSICAL EXAM, ANNUAL: ICD-10-CM

## 2018-05-23 DIAGNOSIS — E88.81 INSULIN RESISTANCE: Chronic | ICD-10-CM

## 2018-05-23 DIAGNOSIS — R76.8 POSITIVE ANA (ANTINUCLEAR ANTIBODY): ICD-10-CM

## 2018-05-23 LAB
ALBUMIN SERPL-MCNC: 3.6 G/DL (ref 3.5–5.2)
ALBUMIN/GLOBULIN RATIO: 1.1 (ref 1–2.5)
ALP BLD-CCNC: 83 U/L (ref 35–104)
ALT SERPL-CCNC: 13 U/L (ref 5–33)
ANION GAP SERPL CALCULATED.3IONS-SCNC: 13 MMOL/L (ref 9–17)
AST SERPL-CCNC: 11 U/L
BILIRUB SERPL-MCNC: 0.17 MG/DL (ref 0.3–1.2)
BUN BLDV-MCNC: 11 MG/DL (ref 6–20)
BUN/CREAT BLD: ABNORMAL (ref 9–20)
CALCIUM SERPL-MCNC: 8.5 MG/DL (ref 8.6–10.4)
CHLORIDE BLD-SCNC: 105 MMOL/L (ref 98–107)
CHOLESTEROL/HDL RATIO: 4
CHOLESTEROL: 141 MG/DL
CO2: 20 MMOL/L (ref 20–31)
CREAT SERPL-MCNC: 0.8 MG/DL (ref 0.5–0.9)
GFR AFRICAN AMERICAN: >60 ML/MIN
GFR NON-AFRICAN AMERICAN: >60 ML/MIN
GFR SERPL CREATININE-BSD FRML MDRD: ABNORMAL ML/MIN/{1.73_M2}
GFR SERPL CREATININE-BSD FRML MDRD: ABNORMAL ML/MIN/{1.73_M2}
GLUCOSE BLD-MCNC: 78 MG/DL (ref 70–99)
HCT VFR BLD CALC: 38.7 % (ref 36.3–47.1)
HDLC SERPL-MCNC: 35 MG/DL
HEMOGLOBIN: 12 G/DL (ref 11.9–15.1)
LDL CHOLESTEROL: 95 MG/DL (ref 0–130)
MCH RBC QN AUTO: 27.5 PG (ref 25.2–33.5)
MCHC RBC AUTO-ENTMCNC: 31 G/DL (ref 28.4–34.8)
MCV RBC AUTO: 88.6 FL (ref 82.6–102.9)
NRBC AUTOMATED: 0 PER 100 WBC
PDW BLD-RTO: 15 % (ref 11.8–14.4)
PLATELET # BLD: 274 K/UL (ref 138–453)
PMV BLD AUTO: 10.4 FL (ref 8.1–13.5)
POTASSIUM SERPL-SCNC: 3.9 MMOL/L (ref 3.7–5.3)
RBC # BLD: 4.37 M/UL (ref 3.95–5.11)
SODIUM BLD-SCNC: 138 MMOL/L (ref 135–144)
TOTAL PROTEIN: 7 G/DL (ref 6.4–8.3)
TRIGL SERPL-MCNC: 55 MG/DL
VLDLC SERPL CALC-MCNC: ABNORMAL MG/DL (ref 1–30)
WBC # BLD: 9.2 K/UL (ref 3.5–11.3)

## 2018-05-24 LAB
ANA REFERENCE RANGE:: ABNORMAL
ANTI DNA DOUBLE STRANDED: 114 IU/ML
ANTI JO-1 IGG: 7 U/ML
ANTI RNP AB: 22 U/ML
ANTI SSA: 20 U/ML
ANTI SSB: 9 U/ML
ANTI-CENTROMERE: 8 U/ML
ANTI-SCLERODERMA: 21 U/ML
ANTI-SMITH: 13 U/ML
ESTIMATED AVERAGE GLUCOSE: 105 MG/DL
HBA1C MFR BLD: 5.3 % (ref 4–6)
HISTONE ANTIBODY: 13 U/ML

## 2018-05-25 ENCOUNTER — PATIENT MESSAGE (OUTPATIENT)
Dept: FAMILY MEDICINE CLINIC | Age: 38
End: 2018-05-25

## 2018-06-04 LAB — CYTOLOGY REPORT: NORMAL

## 2018-08-17 ENCOUNTER — HOSPITAL ENCOUNTER (OUTPATIENT)
Age: 38
Setting detail: SPECIMEN
Discharge: HOME | End: 2018-08-17
Payer: MEDICAID

## 2018-08-17 ENCOUNTER — HOSPITAL ENCOUNTER (OUTPATIENT)
Facility: CLINIC | Age: 38
Discharge: HOME | End: 2018-08-19
Payer: MEDICAID

## 2018-08-17 ENCOUNTER — OFFICE VISIT (OUTPATIENT)
Dept: FAMILY MEDICINE CLINIC | Age: 38
End: 2018-08-17
Payer: MEDICAID

## 2018-08-17 ENCOUNTER — HOSPITAL ENCOUNTER (OUTPATIENT)
Dept: GENERAL RADIOLOGY | Facility: CLINIC | Age: 38
Discharge: HOME | End: 2018-08-19
Payer: MEDICAID

## 2018-08-17 VITALS
TEMPERATURE: 98.6 F | BODY MASS INDEX: 38.27 KG/M2 | RESPIRATION RATE: 12 BRPM | WEIGHT: 230 LBS | HEART RATE: 68 BPM | SYSTOLIC BLOOD PRESSURE: 112 MMHG | DIASTOLIC BLOOD PRESSURE: 70 MMHG

## 2018-08-17 DIAGNOSIS — M25.552 ACUTE HIP PAIN, LEFT: ICD-10-CM

## 2018-08-17 DIAGNOSIS — M62.830 LUMBAR PARASPINAL MUSCLE SPASM: ICD-10-CM

## 2018-08-17 DIAGNOSIS — M54.50 ACUTE LEFT-SIDED LOW BACK PAIN WITHOUT SCIATICA: ICD-10-CM

## 2018-08-17 DIAGNOSIS — R76.8 POSITIVE ANA (ANTINUCLEAR ANTIBODY): ICD-10-CM

## 2018-08-17 DIAGNOSIS — M25.552 ACUTE HIP PAIN, LEFT: Primary | ICD-10-CM

## 2018-08-17 DIAGNOSIS — R76.8 DS DNA ANTIBODY POSITIVE: ICD-10-CM

## 2018-08-17 PROCEDURE — 72202 X-RAY EXAM SI JOINTS 3/> VWS: CPT

## 2018-08-17 PROCEDURE — G8427 DOCREV CUR MEDS BY ELIG CLIN: HCPCS | Performed by: NURSE PRACTITIONER

## 2018-08-17 PROCEDURE — 1036F TOBACCO NON-USER: CPT | Performed by: NURSE PRACTITIONER

## 2018-08-17 PROCEDURE — 73502 X-RAY EXAM HIP UNI 2-3 VIEWS: CPT

## 2018-08-17 PROCEDURE — G8417 CALC BMI ABV UP PARAM F/U: HCPCS | Performed by: NURSE PRACTITIONER

## 2018-08-17 PROCEDURE — 99214 OFFICE O/P EST MOD 30 MIN: CPT | Performed by: NURSE PRACTITIONER

## 2018-08-17 RX ORDER — FLUOXETINE HYDROCHLORIDE 40 MG/1
50 CAPSULE ORAL DAILY
Qty: 30 CAPSULE | Refills: 0 | Status: SHIPPED | COMMUNITY
Start: 2018-08-17 | End: 2018-08-17 | Stop reason: DRUGHIGH

## 2018-08-17 RX ORDER — TURMERIC ROOT EXTRACT 500 MG
2 TABLET ORAL DAILY
Qty: 60 TABLET | Refills: 0 | COMMUNITY
Start: 2018-08-17 | End: 2019-08-17

## 2018-08-17 RX ORDER — TIZANIDINE 4 MG/1
4 TABLET ORAL EVERY 8 HOURS PRN
Qty: 60 TABLET | Refills: 0 | Status: SHIPPED | OUTPATIENT
Start: 2018-08-17 | End: 2019-08-17

## 2018-08-17 RX ORDER — FLUOXETINE HYDROCHLORIDE 40 MG/1
CAPSULE ORAL
Qty: 30 CAPSULE | Refills: 0 | COMMUNITY
Start: 2018-08-17

## 2018-08-17 ASSESSMENT — ENCOUNTER SYMPTOMS
WHEEZING: 0
BACK PAIN: 1
BLOOD IN STOOL: 0
NAUSEA: 0
VOMITING: 0
COUGH: 0
CONSTIPATION: 0
SHORTNESS OF BREATH: 0
ABDOMINAL PAIN: 1
EYES NEGATIVE: 1
CHEST TIGHTNESS: 0
DIARRHEA: 0
BOWEL INCONTINENCE: 0

## 2018-08-17 NOTE — PROGRESS NOTES
Subjective:      Patient ID: Brian Perez is a 45 y.o. female. Visit Information    Have you changed or started any medications since your last visit including any over-the-counter medicines, vitamins, or herbal medicines? yes - Med list updated   Are you having any side effects from any of your medications? -  no  Have you stopped taking any of your medications? Is so, why? -  yes - Med list updated    Have you seen any other physician or provider since your last visit? Yes - Records Requested Therapy  Have you had any other diagnostic tests since your last visit? No  Have you been seen in the emergency room and/or had an admission to a hospital since we last saw you? No  Have you had your routine dental cleaning in the past 6 months? no    Have you activated your [employer] account? If not, what are your barriers?  Yes     Patient Care Team:  ZOHREH Treviño CNP as PCP - General (Family Nurse Practitioner)  ZOHREH Treviño CNP as PCP - [facility] Attributed Provider    Medical History Review  Past Medical, Family, and Social History reviewed and does not contribute to the patient presenting condition    Health Maintenance   Topic Date Due    Flu vaccine (1) 09/01/2018    Cervical cancer screen  05/08/2023    DTaP/Tdap/Td vaccine (2 - Td) 12/10/2025    HIV screen  Completed       PHQ Scores 5/8/2018 3/10/2015   PHQ2 Score 0 0   PHQ9 Score 0 0     Interpretation of Total Score Depression Severity: 1-4 = Minimal depression, 5-9 = Mild depression, 10-14 = Moderate depression, 15-19 = Moderately severe depression, 20-27 = Severe depression    Current Outpatient Prescriptions   Medication Sig Dispense Refill    ferrous sulfate (SLOW FE) 142 (45 Fe) MG extended release tablet Take 142 mg by mouth as needed      FLUoxetine (PROZAC) 40 MG capsule 50 mg daily per neurologist 30 capsule 0    Turmeric 500 MG TABS Take 2 tablets by mouth daily 60 tablet 0    tiZANidine (ZANAFLEX) 4 MG tablet Take 1 tablet by mouth every 8 hours as needed (muscle spasms) 60 tablet 0    traZODone (DESYREL) 50 MG tablet Take 100 mg by mouth nightly  0    ibuprofen (ADVIL;MOTRIN) 800 MG tablet Take 1 tablet by mouth every 6 hours as needed  0    topiramate (TOPAMAX) 200 MG tablet Take 1 tablet by mouth 2 times daily  4    butalbital-APAP-caffeine (FIORICET) -40 MG CAPS per capsule Take 1 capsule by mouth as needed for Headaches      omeprazole (PRILOSEC) 20 MG capsule Take 1 capsule by mouth Daily 30 capsule 3    Calcium Carbonate-Vitamin D (CALCIUM-VITAMIN D) 500-200 MG-UNIT per tablet Take 1 tablet by mouth 2 times daily (with meals).  fluocinonide (LIDEX) 0.05 % ointment Apply thin layer to rash on neck and chest (not face, axilla or groin) 30 g 2    clindamycin (CLEOCIN T) 1 % lotion Apply to face, chest and back daily 60 mL 3    triamcinolone (ARISTOCORT) 0.5 % ointment Apply topically 2 times daily Apply topically 2 times daily. No current facility-administered medications for this visit. Hip Pain    The incident occurred more than 1 week ago. There was no injury mechanism. The pain is present in the left hip and left thigh. The quality of the pain is described as stabbing and shooting. The pain is at a severity of 6/10. The pain is moderate. The pain has been fluctuating since onset. Associated symptoms include an inability to bear weight (limping) and tingling (into upper back / left thigh). Pertinent negatives include no loss of motion, loss of sensation or numbness. She reports no foreign bodies present. The symptoms are aggravated by movement and weight bearing. She has tried heat, ice, rest, NSAIDs and acetaminophen for the symptoms. The treatment provided mild (very little relief) relief. Back Pain   This is a new problem. The current episode started 1 to 4 weeks ago. The problem occurs constantly. The problem has been waxing and waning (over time getting worse) since onset.  The pain is present in the sacro-iliac. The quality of the pain is described as stabbing and shooting. The pain radiates to the left thigh. The pain is at a severity of 6/10. The pain is moderate. The pain is worse during the day. The symptoms are aggravated by bending, twisting and sitting. Associated symptoms include abdominal pain (left lower quadrant - coming from back), headaches (chronic intermittent), leg pain (left anterior thigh) and tingling (into upper back / left thigh). Pertinent negatives include no bladder incontinence, bowel incontinence, chest pain, dysuria, fever, numbness, pelvic pain, perianal numbness or weakness. Risk factors include obesity. She has tried analgesics, bed rest, ice, heat, NSAIDs and home exercises for the symptoms. The treatment provided mild relief. Review of Systems   Constitutional: Positive for activity change and fatigue. Negative for appetite change and fever. Constantly changing positions in office, appears uncomfortable. HENT: Negative. Eyes: Negative. Wears glasses - normal eye exam with eye doctor  Saw neuro ophthalmologist    Respiratory: Negative for cough, chest tightness, shortness of breath and wheezing. Cardiovascular: Positive for palpitations (intemittently - only a few seconds). Negative for chest pain and leg swelling. Gastrointestinal: Positive for abdominal pain (left lower quadrant - coming from back). Negative for blood in stool, bowel incontinence, constipation, diarrhea, nausea and vomiting. Genitourinary: Negative for bladder incontinence, decreased urine volume, difficulty urinating, dysuria, flank pain, frequency, pelvic pain and urgency. Musculoskeletal: Positive for arthralgias (left hip pain) and back pain (left lower back - radiating into upper back and down left leg). Tingling and pain down left leg   Skin: Negative for wound.    Neurological: Positive for dizziness (chronic intermittent), tingling (into upper back normal. She exhibits no distension. There is no hepatosplenomegaly. There is no tenderness. There is no rebound and no CVA tenderness. Genitourinary: Pelvic exam was performed with patient supine. Musculoskeletal: She exhibits no edema. Left hip: She exhibits decreased range of motion and tenderness. She exhibits no swelling and no deformity. Lumbar back: She exhibits decreased range of motion, tenderness, pain and spasm. Back:    Lymphadenopathy:     She has no cervical adenopathy. She has no axillary adenopathy. Neurological: She is alert and oriented to person, place, and time. She has normal strength. No cranial nerve deficit or sensory deficit. Gait normal. GCS eye subscore is 4. GCS verbal subscore is 5. GCS motor subscore is 6. Skin: Skin is warm and dry. No rash noted. Psychiatric: She has a normal mood and affect. Her speech is normal and behavior is normal. Judgment and thought content normal. Cognition and memory are normal.   Nursing note and vitals reviewed. Assessment:      Diagnosis Orders   1. Acute hip pain, left  [facility] [place]    XR HIP LEFT (2-3 VIEWS)    XR SACROILIAC JOINTS (MIN 3 VIEWS)    tiZANidine (ZANAFLEX) 4 MG tablet   2. Acute left-sided low back pain without sciatica  [facility] [place]    XR HIP LEFT (2-3 VIEWS)    XR SACROILIAC JOINTS (MIN 3 VIEWS)    tiZANidine (ZANAFLEX) 4 MG tablet   3. Lumbar paraspinal muscle spasm  [facility] [place]    XR HIP LEFT (2-3 VIEWS)    XR SACROILIAC JOINTS (MIN 3 VIEWS)    tiZANidine (ZANAFLEX) 4 MG tablet   4. Positive LOURDES (antinuclear antibody)  [facility] Geovanny Loera MD   5.  Ds DNA antibody positive  [facility] Geovanny Loera MD       Plan:     Referral to rheumatology d/t positive LOURDES  xray's of SI joints and left hip  Proceed with start Zanaflex as

## 2018-08-17 NOTE — PATIENT INSTRUCTIONS
each day. · Massage the muscle to relieve pressure. ¨ Sit on the floor. Lean to one side so that the hip on your sore side is off the ground. Put a tennis ball under your buttock on that side. ¨ As you put weight onto the tennis ball, you may find spots that are especially sore. Move gently so that the tennis ball gently massages each of the sore spots. · Use ice or heat to help reduce pain. Put ice or a cold pack or a heating pad set on low or a warm cloth on the sore area for 10 to 20 minutes at a time. Put a thin cloth between the ice pack or heating pad and your skin. · Ask your doctor if you can take an over-the-counter pain medicine, such as acetaminophen (Tylenol), ibuprofen (Advil, Motrin), or naproxen (Aleve). Be safe with medicines. Read and follow all instructions on the label. · Have your doctor or a physical therapist watch how you move. You may need physical therapy or special inserts in your shoes (orthotics) to help you move in a way that does not put pressure on your nerves. When should you call for help? Watch closely for changes in your health, and be sure to contact your doctor if:    · You do not feel better after several weeks of home care.     · Your pain gets worse.     · Your leg becomes weak or numb. Where can you learn more? Go to https://KavaliapepanfiloOzura Worldeb.kenxus. org and sign in to your Promuc account. Enter K708 in the [facility] box to learn more about \"Piriformis Syndrome: Care Instructions. \"     If you do not have an account, please click on the \"Sign Up Now\" link. Current as of: November 29, 2017  Content Version: 11.7  © 7780-4096 Evolva. Care instructions adapted under license by [facility] ([facility]). If you have questions about a medical condition or this instruction, always ask your healthcare professional. [address] any warranty or liability for your use of this information.        Patient Education know your test results and keep a list of the medicines you take. Where can you learn more? Go to https://chpepiceweb.Quarri Technologies. org and sign in to your Pufetto account. Enter A939 in the [facility] box to learn more about \"Piriformis Syndrome: Exercises. \"     If you do not have an account, please click on the \"Sign Up Now\" link. Current as of: November 29, 2017  Content Version: 11.7  © 1424-8200 Plizy. Care instructions adapted under license by [facility] ([facility]). If you have questions about a medical condition or this instruction, always ask your healthcare professional. [place] [address] any warranty or liability for your use of this information. Patient Education        Back Spasm: Care Instructions  Your Care Instructions  A back spasm is sudden tightness and pain in your back muscles. It may happen from overuse or an injury. Things like sleeping in an awkward way, bending, lifting, standing, or sitting can sometimes cause a spasm. But the cause isn't always clear. Home treatment includes using heat or ice, taking over-the-counter (OTC) pain medicines, and avoiding activities that may cause back pain. For a back spasm that doesn't get better with home care, your doctor may prescribe medicine. Treatments such as massage or manipulation may also help ease a back spasm. Your doctor may also suggest exercise or physical therapy to help improve strength and flexibility in your back muscles. In most cases, getting back to your normal activities is good foryour back. Just make sure to avoid doing things that make your pain worse. Follow-up care is a key part of your treatment and safety. Be sure to make and go to all appointments, and call your doctor if you are having problems. It's also a good idea to know your test results and keep a list of the medicines you take.   How can you care for yourself at home?   Heat, ice, and medicines    · To relieve pain, bedtime. Regular stretching can help relax your muscles.     · To prevent future back pain, do exercises to stretch and strengthen your back and stomach. Learn to use good posture, safe lifting techniques, and other ways to move to help you avoid back pain. When should you call for help? Call 911 anytime you think you may need emergency care. For example, call if:    · You are unable to move an arm or a leg at all.   [facility] your doctor now or seek immediate medical care if:    · You have new or worse symptoms in your legs, belly, or buttocks. Symptoms may include:  ¨ Numbness or tingling. ¨ Weakness. ¨ Pain.     · You lose bladder or bowel control.    Watch closely for changes in your health, and be sure to contact your doctor if:    · You have a fever, lose weight, or don't feel well.     · You do not get better as expected. Where can you learn more? Go to https://MobileSpaces.Vahna. org and sign in to your SoundHound account. Enter E232 in the [employer] box to learn more about \"Back Spasm: Care Instructions. \"     If you do not have an account, please click on the \"Sign Up Now\" link. Current as of: November 29, 2017  Content Version: 11.7  © 8661-1449 eTobb, Incorporated. Care instructions adapted under license by [facility] ([facility]). If you have questions about a medical condition or this instruction, always ask your healthcare professional. [place] [address] any warranty or liability for your use of this information.

## 2018-08-22 LAB — ANTI-NUCLEAR ANTIBODY (ANA): POSITIVE

## 2018-09-24 ENCOUNTER — TELEPHONE (OUTPATIENT)
Dept: OBGYN CLINIC | Age: 38
End: 2018-09-24

## 2018-09-24 ENCOUNTER — OFFICE VISIT (OUTPATIENT)
Dept: OBGYN CLINIC | Age: 38
End: 2018-09-24
Payer: MEDICAID

## 2018-09-24 VITALS
SYSTOLIC BLOOD PRESSURE: 114 MMHG | HEIGHT: 66 IN | WEIGHT: 225 LBS | HEART RATE: 72 BPM | BODY MASS INDEX: 36.16 KG/M2 | DIASTOLIC BLOOD PRESSURE: 80 MMHG

## 2018-09-24 DIAGNOSIS — N92.6 IRREGULAR MENSES: Primary | ICD-10-CM

## 2018-09-24 DIAGNOSIS — Z97.5 NEXPLANON IN PLACE: ICD-10-CM

## 2018-09-24 DIAGNOSIS — Z30.017 INSERTION OF NEXPLANON: ICD-10-CM

## 2018-09-24 PROCEDURE — G8427 DOCREV CUR MEDS BY ELIG CLIN: HCPCS | Performed by: ADVANCED PRACTICE MIDWIFE

## 2018-09-24 PROCEDURE — G8417 CALC BMI ABV UP PARAM F/U: HCPCS | Performed by: ADVANCED PRACTICE MIDWIFE

## 2018-09-24 PROCEDURE — 99213 OFFICE O/P EST LOW 20 MIN: CPT | Performed by: ADVANCED PRACTICE MIDWIFE

## 2018-09-24 PROCEDURE — 1036F TOBACCO NON-USER: CPT | Performed by: ADVANCED PRACTICE MIDWIFE

## 2018-09-24 RX ORDER — DESOGESTREL AND ETHINYL ESTRADIOL 0.15-0.03
1 KIT ORAL DAILY
Qty: 4 PACKET | Refills: 0 | Status: SHIPPED | OUTPATIENT
Start: 2018-09-24 | End: 2018-10-11 | Stop reason: SDUPTHER

## 2018-09-24 ASSESSMENT — ENCOUNTER SYMPTOMS
RESPIRATORY NEGATIVE: 1
EYES NEGATIVE: 1
GASTROINTESTINAL NEGATIVE: 1

## 2018-09-24 NOTE — TELEPHONE ENCOUNTER
Patient states that she had the Nexplanon put in on 05/15/2018 and was given told the side effects and wht to expect of the implant. Patient is calling today because she's tired iof the vaginal bleeding and is really concerned about it. Patient states that she has some spotting and it was brownish in color the first month and as the months has passed the patient still has spotting that is brownish in color but the days had increased. This past Friday patient states that there was no spotting the bleeding came on like a full blown period. Patient states that she is so over it.

## 2018-10-11 ENCOUNTER — TELEPHONE (OUTPATIENT)
Dept: OBGYN CLINIC | Age: 38
End: 2018-10-11

## 2018-10-11 DIAGNOSIS — N92.6 IRREGULAR MENSES: ICD-10-CM

## 2018-10-11 RX ORDER — DESOGESTREL AND ETHINYL ESTRADIOL 0.15-0.03
1 KIT ORAL DAILY
Qty: 4 PACKET | Refills: 0 | Status: SHIPPED | OUTPATIENT
Start: 2018-10-11 | End: 2020-09-04

## 2018-10-11 NOTE — TELEPHONE ENCOUNTER
Patient  States she needs new rx for Desogen 28 day. She needs 21 days instead, per pharmacy so she can take continuously.

## 2018-11-28 ENCOUNTER — TELEPHONE (OUTPATIENT)
Dept: OBGYN CLINIC | Age: 38
End: 2018-11-28

## 2018-12-03 ENCOUNTER — TELEPHONE (OUTPATIENT)
Dept: OBGYN CLINIC | Age: 38
End: 2018-12-03

## 2018-12-03 DIAGNOSIS — N94.6 DYSMENORRHEA: Primary | ICD-10-CM

## 2018-12-11 ENCOUNTER — PROCEDURE VISIT (OUTPATIENT)
Dept: OBGYN CLINIC | Age: 38
End: 2018-12-11
Payer: MEDICAID

## 2018-12-11 VITALS
DIASTOLIC BLOOD PRESSURE: 72 MMHG | HEART RATE: 88 BPM | BODY MASS INDEX: 34.54 KG/M2 | SYSTOLIC BLOOD PRESSURE: 106 MMHG | WEIGHT: 214 LBS

## 2018-12-11 DIAGNOSIS — Z30.46 ENCOUNTER FOR NEXPLANON REMOVAL: Primary | ICD-10-CM

## 2018-12-11 DIAGNOSIS — Z30.013 ENCOUNTER FOR INITIAL PRESCRIPTION OF INJECTABLE CONTRACEPTIVE: ICD-10-CM

## 2018-12-11 DIAGNOSIS — Z30.42 DEPOT CONTRACEPTION: ICD-10-CM

## 2018-12-11 PROBLEM — Z30.017 INSERTION OF NEXPLANON: Status: RESOLVED | Noted: 2018-05-15 | Resolved: 2018-12-11

## 2018-12-11 PROCEDURE — 11982 REMOVE DRUG IMPLANT DEVICE: CPT | Performed by: ADVANCED PRACTICE MIDWIFE

## 2018-12-11 RX ORDER — MEDROXYPROGESTERONE ACETATE 150 MG/ML
150 INJECTION, SUSPENSION INTRAMUSCULAR ONCE
Status: COMPLETED | OUTPATIENT
Start: 2018-12-11 | End: 2018-12-11

## 2018-12-11 RX ORDER — MEDROXYPROGESTERONE ACETATE 150 MG/ML
150 INJECTION, SUSPENSION INTRAMUSCULAR ONCE
Qty: 1 ML | Refills: 3 | Status: SHIPPED | OUTPATIENT
Start: 2018-12-11 | End: 2019-11-22 | Stop reason: SDUPTHER

## 2018-12-11 RX ADMIN — MEDROXYPROGESTERONE ACETATE 150 MG: 150 INJECTION, SUSPENSION INTRAMUSCULAR at 15:52

## 2019-02-27 ENCOUNTER — NURSE ONLY (OUTPATIENT)
Dept: OBGYN CLINIC | Age: 39
End: 2019-02-27
Payer: MEDICAID

## 2019-02-27 DIAGNOSIS — Z30.42 ENCOUNTER FOR SURVEILLANCE OF INJECTABLE CONTRACEPTIVE: Primary | ICD-10-CM

## 2019-02-27 PROCEDURE — 99211 OFF/OP EST MAY X REQ PHY/QHP: CPT | Performed by: ADVANCED PRACTICE MIDWIFE

## 2019-02-27 RX ORDER — MEDROXYPROGESTERONE ACETATE 150 MG/ML
150 INJECTION, SUSPENSION INTRAMUSCULAR ONCE
Status: COMPLETED | OUTPATIENT
Start: 2019-02-27 | End: 2019-02-27

## 2019-02-27 RX ADMIN — MEDROXYPROGESTERONE ACETATE 150 MG: 150 INJECTION, SUSPENSION INTRAMUSCULAR at 15:28

## 2019-05-20 ENCOUNTER — NURSE ONLY (OUTPATIENT)
Dept: OBGYN CLINIC | Age: 39
End: 2019-05-20
Payer: MEDICAID

## 2019-05-20 DIAGNOSIS — Z30.42 ENCOUNTER FOR SURVEILLANCE OF INJECTABLE CONTRACEPTIVE: Primary | ICD-10-CM

## 2019-05-20 PROCEDURE — 96372 THER/PROPH/DIAG INJ SC/IM: CPT | Performed by: ADVANCED PRACTICE MIDWIFE

## 2019-05-20 RX ORDER — MEDROXYPROGESTERONE ACETATE 150 MG/ML
150 INJECTION, SUSPENSION INTRAMUSCULAR ONCE
Status: COMPLETED | OUTPATIENT
Start: 2019-05-20 | End: 2019-05-20

## 2019-05-20 RX ADMIN — MEDROXYPROGESTERONE ACETATE 150 MG: 150 INJECTION, SUSPENSION INTRAMUSCULAR at 11:47

## 2019-11-21 DIAGNOSIS — Z30.42 DEPOT CONTRACEPTION: ICD-10-CM

## 2019-11-22 RX ORDER — MEDROXYPROGESTERONE ACETATE 150 MG/ML
INJECTION, SUSPENSION INTRAMUSCULAR
Qty: 1 ML | Refills: 0 | Status: SHIPPED | OUTPATIENT
Start: 2019-11-22 | End: 2020-02-11

## 2020-02-11 RX ORDER — MEDROXYPROGESTERONE ACETATE 150 MG/ML
INJECTION, SUSPENSION INTRAMUSCULAR
Qty: 1 ML | Refills: 0 | Status: SHIPPED | OUTPATIENT
Start: 2020-02-11 | End: 2020-06-08 | Stop reason: SDUPTHER

## 2020-06-08 ENCOUNTER — HOSPITAL ENCOUNTER (OUTPATIENT)
Age: 40
Setting detail: SPECIMEN
Discharge: HOME | End: 2020-06-08
Payer: MEDICAID

## 2020-06-08 ENCOUNTER — OFFICE VISIT (OUTPATIENT)
Dept: OBGYN CLINIC | Age: 40
End: 2020-06-08
Payer: MEDICAID

## 2020-06-08 VITALS
BODY MASS INDEX: 34.91 KG/M2 | DIASTOLIC BLOOD PRESSURE: 78 MMHG | WEIGHT: 222.4 LBS | HEART RATE: 80 BPM | HEIGHT: 67 IN | SYSTOLIC BLOOD PRESSURE: 126 MMHG

## 2020-06-08 LAB
CONTROL: PRESENT
PREGNANCY TEST URINE, POC: NEGATIVE

## 2020-06-08 PROCEDURE — 99395 PREV VISIT EST AGE 18-39: CPT | Performed by: ADVANCED PRACTICE MIDWIFE

## 2020-06-08 PROCEDURE — 96372 THER/PROPH/DIAG INJ SC/IM: CPT | Performed by: ADVANCED PRACTICE MIDWIFE

## 2020-06-08 PROCEDURE — 81025 URINE PREGNANCY TEST: CPT | Performed by: ADVANCED PRACTICE MIDWIFE

## 2020-06-08 RX ORDER — MEDROXYPROGESTERONE ACETATE 150 MG/ML
150 INJECTION, SUSPENSION INTRAMUSCULAR ONCE
Status: COMPLETED | OUTPATIENT
Start: 2020-06-08 | End: 2020-06-08

## 2020-06-08 RX ORDER — MEDROXYPROGESTERONE ACETATE 150 MG/ML
INJECTION, SUSPENSION INTRAMUSCULAR
Qty: 1 ML | Refills: 3 | Status: SHIPPED | OUTPATIENT
Start: 2020-06-08 | End: 2021-07-02

## 2020-06-08 RX ADMIN — MEDROXYPROGESTERONE ACETATE 150 MG: 150 INJECTION, SUSPENSION INTRAMUSCULAR at 14:50

## 2020-06-08 ASSESSMENT — ENCOUNTER SYMPTOMS
EYES NEGATIVE: 1
GASTROINTESTINAL NEGATIVE: 1
ALLERGIC/IMMUNOLOGIC NEGATIVE: 1
RESPIRATORY NEGATIVE: 1

## 2020-06-08 NOTE — PROGRESS NOTES
After obtaining consent, and per orders of [facility], injection of medroxyprogesterone  given in Left upper quad. gluteus by Rachel Silverman. Patient instructed to remain in clinic for 20 minutes afterwards, and to report any adverse reaction to me immediately.
Rectum: Normal.   Musculoskeletal: Normal range of motion. Lymphadenopathy:      Cervical: No cervical adenopathy. Lower Body: No right inguinal adenopathy. No left inguinal adenopathy. Skin:     General: Skin is warm and dry. Findings: No rash. Neurological:      Mental Status: She is alert and oriented to person, place, and time. Psychiatric:         Behavior: Behavior normal.         Thought Content: Thought content normal.         Judgment: Judgment normal.         Assessment:      Normal gyne exam  Depo continuation      Plan:      Await Pap  Mamm ordered.         ZOHREH Wheat CNM

## 2020-06-12 LAB — CYTOLOGY REPORT: NORMAL

## 2020-08-07 ENCOUNTER — TELEPHONE (OUTPATIENT)
Dept: OBGYN CLINIC | Age: 40
End: 2020-08-07

## 2020-08-26 ENCOUNTER — NURSE ONLY (OUTPATIENT)
Dept: OBGYN CLINIC | Age: 40
End: 2020-08-26
Payer: MEDICAID

## 2020-08-26 VITALS
BODY MASS INDEX: 35.68 KG/M2 | SYSTOLIC BLOOD PRESSURE: 121 MMHG | TEMPERATURE: 98.6 F | DIASTOLIC BLOOD PRESSURE: 78 MMHG | WEIGHT: 229.2 LBS

## 2020-08-26 PROCEDURE — 96372 THER/PROPH/DIAG INJ SC/IM: CPT | Performed by: ADVANCED PRACTICE MIDWIFE

## 2020-08-26 RX ORDER — MEDROXYPROGESTERONE ACETATE 150 MG/ML
150 INJECTION, SUSPENSION INTRAMUSCULAR ONCE
Status: COMPLETED | OUTPATIENT
Start: 2020-08-26 | End: 2020-08-26

## 2020-08-26 RX ADMIN — MEDROXYPROGESTERONE ACETATE 150 MG: 150 INJECTION, SUSPENSION INTRAMUSCULAR at 13:29

## 2020-09-04 ENCOUNTER — HOSPITAL ENCOUNTER (OUTPATIENT)
Age: 40
Setting detail: SPECIMEN
Discharge: HOME | End: 2020-09-04
Payer: MEDICAID

## 2020-09-04 ENCOUNTER — OFFICE VISIT (OUTPATIENT)
Dept: OBGYN CLINIC | Age: 40
End: 2020-09-04
Payer: MEDICAID

## 2020-09-04 VITALS
WEIGHT: 228 LBS | HEIGHT: 66 IN | BODY MASS INDEX: 36.64 KG/M2 | DIASTOLIC BLOOD PRESSURE: 76 MMHG | SYSTOLIC BLOOD PRESSURE: 110 MMHG

## 2020-09-04 PROCEDURE — G8427 DOCREV CUR MEDS BY ELIG CLIN: HCPCS | Performed by: ADVANCED PRACTICE MIDWIFE

## 2020-09-04 PROCEDURE — 99213 OFFICE O/P EST LOW 20 MIN: CPT | Performed by: ADVANCED PRACTICE MIDWIFE

## 2020-09-04 PROCEDURE — G8417 CALC BMI ABV UP PARAM F/U: HCPCS | Performed by: ADVANCED PRACTICE MIDWIFE

## 2020-09-04 PROCEDURE — 1036F TOBACCO NON-USER: CPT | Performed by: ADVANCED PRACTICE MIDWIFE

## 2020-09-04 RX ORDER — NITROFURANTOIN 25; 75 MG/1; MG/1
100 CAPSULE ORAL 2 TIMES DAILY
Qty: 14 CAPSULE | Refills: 0 | Status: SHIPPED | OUTPATIENT
Start: 2020-09-04 | End: 2020-09-11

## 2020-09-04 ASSESSMENT — ENCOUNTER SYMPTOMS
GASTROINTESTINAL NEGATIVE: 1
RESPIRATORY NEGATIVE: 1

## 2020-09-05 LAB
CULTURE: NORMAL
Lab: NORMAL
SPECIMEN DESCRIPTION: NORMAL

## 2020-09-24 ENCOUNTER — TELEPHONE (OUTPATIENT)
Dept: OBGYN CLINIC | Age: 40
End: 2020-09-24

## 2020-11-11 ENCOUNTER — NURSE ONLY (OUTPATIENT)
Dept: OBGYN CLINIC | Age: 40
End: 2020-11-11
Payer: MEDICAID

## 2020-11-11 VITALS
TEMPERATURE: 97.2 F | SYSTOLIC BLOOD PRESSURE: 111 MMHG | DIASTOLIC BLOOD PRESSURE: 66 MMHG | BODY MASS INDEX: 37.01 KG/M2 | HEART RATE: 78 BPM | WEIGHT: 230.3 LBS | HEIGHT: 66 IN

## 2020-11-11 PROCEDURE — 96372 THER/PROPH/DIAG INJ SC/IM: CPT | Performed by: ADVANCED PRACTICE MIDWIFE

## 2020-11-11 RX ORDER — MEDROXYPROGESTERONE ACETATE 150 MG/ML
150 INJECTION, SUSPENSION INTRAMUSCULAR ONCE
Status: COMPLETED | OUTPATIENT
Start: 2020-11-11 | End: 2020-11-11

## 2020-11-11 RX ADMIN — MEDROXYPROGESTERONE ACETATE 150 MG: 150 INJECTION, SUSPENSION INTRAMUSCULAR at 13:29

## 2020-11-11 NOTE — PROGRESS NOTES
After obtaining consent, and per orders of [facility] CNM, injection of Depo Provera 150mg given in RT dorso gluteal  by Vance Puckett. Patient instructed to remain in clinic for 20 minutes afterwards, and to report any adverse reaction to me immediately.     OAA:[id number]  EXP:02/22  GFW:[id number]

## 2021-01-28 ENCOUNTER — NURSE ONLY (OUTPATIENT)
Dept: OBGYN CLINIC | Age: 41
End: 2021-01-28
Payer: MEDICAID

## 2021-01-28 VITALS
WEIGHT: 232.5 LBS | HEART RATE: 75 BPM | HEIGHT: 66 IN | DIASTOLIC BLOOD PRESSURE: 66 MMHG | SYSTOLIC BLOOD PRESSURE: 102 MMHG | BODY MASS INDEX: 37.37 KG/M2

## 2021-01-28 DIAGNOSIS — Z30.42 ENCOUNTER FOR DEPO-PROVERA CONTRACEPTION: Primary | ICD-10-CM

## 2021-01-28 PROCEDURE — 96372 THER/PROPH/DIAG INJ SC/IM: CPT | Performed by: ADVANCED PRACTICE MIDWIFE

## 2021-01-28 RX ORDER — MEDROXYPROGESTERONE ACETATE 150 MG/ML
150 INJECTION, SUSPENSION INTRAMUSCULAR ONCE
Status: COMPLETED | OUTPATIENT
Start: 2021-01-28 | End: 2021-01-28

## 2021-01-28 RX ADMIN — MEDROXYPROGESTERONE ACETATE 150 MG: 150 INJECTION, SUSPENSION INTRAMUSCULAR at 13:28

## 2021-01-28 NOTE — PROGRESS NOTES
After obtaining consent, and per orders of Jimbo Andrade CNM, injection of medroxyprogesterone given in Right upper quad. gluteus by Kristi Gauthier. Patient instructed to remain in clinic for 20 minutes afterwards, and to report any adverse reaction to me immediately.

## 2021-02-04 ENCOUNTER — OFFICE VISIT (OUTPATIENT)
Dept: OBGYN CLINIC | Age: 41
End: 2021-02-04
Payer: MEDICAID

## 2021-02-04 VITALS
WEIGHT: 234.6 LBS | BODY MASS INDEX: 37.7 KG/M2 | HEIGHT: 66 IN | HEART RATE: 65 BPM | TEMPERATURE: 97.2 F | SYSTOLIC BLOOD PRESSURE: 110 MMHG | DIASTOLIC BLOOD PRESSURE: 72 MMHG

## 2021-02-04 DIAGNOSIS — N92.1 BREAKTHROUGH BLEEDING ON DEPO-PROVERA: Primary | ICD-10-CM

## 2021-02-04 PROCEDURE — 1036F TOBACCO NON-USER: CPT | Performed by: ADVANCED PRACTICE MIDWIFE

## 2021-02-04 PROCEDURE — G8484 FLU IMMUNIZE NO ADMIN: HCPCS | Performed by: ADVANCED PRACTICE MIDWIFE

## 2021-02-04 PROCEDURE — G8427 DOCREV CUR MEDS BY ELIG CLIN: HCPCS | Performed by: ADVANCED PRACTICE MIDWIFE

## 2021-02-04 PROCEDURE — G8417 CALC BMI ABV UP PARAM F/U: HCPCS | Performed by: ADVANCED PRACTICE MIDWIFE

## 2021-02-04 PROCEDURE — 99213 OFFICE O/P EST LOW 20 MIN: CPT | Performed by: ADVANCED PRACTICE MIDWIFE

## 2021-02-04 ASSESSMENT — ENCOUNTER SYMPTOMS
GASTROINTESTINAL NEGATIVE: 1
ALLERGIC/IMMUNOLOGIC NEGATIVE: 1
RESPIRATORY NEGATIVE: 1
EYES NEGATIVE: 1

## 2021-02-04 NOTE — PROGRESS NOTES
On this date 02/04/21 I have spent 20 minutes reviewing previous notes, test results and face to face with the patient discussing the diagnosis and importance of compliance with the treatment plan as well as documenting on the day of the visit. An electronic signature was used to authenticate this note.     --ZOHREH Arita CNM

## 2021-03-15 ENCOUNTER — TELEPHONE (OUTPATIENT)
Dept: OBGYN CLINIC | Age: 41
End: 2021-03-15

## 2021-04-16 ENCOUNTER — NURSE ONLY (OUTPATIENT)
Dept: OBGYN CLINIC | Age: 41
End: 2021-04-16
Payer: MEDICAID

## 2021-04-16 VITALS — TEMPERATURE: 97.5 F

## 2021-04-16 DIAGNOSIS — Z30.42 ENCOUNTER FOR DEPO-PROVERA CONTRACEPTION: Primary | ICD-10-CM

## 2021-04-16 PROCEDURE — 96372 THER/PROPH/DIAG INJ SC/IM: CPT | Performed by: ADVANCED PRACTICE MIDWIFE

## 2021-04-16 RX ORDER — MEDROXYPROGESTERONE ACETATE 150 MG/ML
150 INJECTION, SUSPENSION INTRAMUSCULAR ONCE
Status: COMPLETED | OUTPATIENT
Start: 2021-04-16 | End: 2021-04-16

## 2021-04-16 RX ADMIN — MEDROXYPROGESTERONE ACETATE 150 MG: 150 INJECTION, SUSPENSION INTRAMUSCULAR at 12:11

## 2021-07-02 DIAGNOSIS — Z30.42 DEPOT CONTRACEPTION: ICD-10-CM

## 2021-07-02 RX ORDER — MEDROXYPROGESTERONE ACETATE 150 MG/ML
INJECTION, SUSPENSION INTRAMUSCULAR
Qty: 1 ML | Refills: 0 | Status: SHIPPED | OUTPATIENT
Start: 2021-07-02 | End: 2021-09-08

## 2021-07-02 NOTE — TELEPHONE ENCOUNTER
LMOVM for pt to call and schedule annual exam.  Refill for depo scheduled for 07/08/21 needs to be sent in.

## 2021-07-08 ENCOUNTER — NURSE ONLY (OUTPATIENT)
Dept: OBGYN CLINIC | Age: 41
End: 2021-07-08
Payer: MEDICAID

## 2021-07-08 VITALS
BODY MASS INDEX: 36.73 KG/M2 | HEART RATE: 73 BPM | SYSTOLIC BLOOD PRESSURE: 102 MMHG | HEIGHT: 67 IN | DIASTOLIC BLOOD PRESSURE: 60 MMHG | WEIGHT: 234 LBS

## 2021-07-08 DIAGNOSIS — Z30.42 ENCOUNTER FOR DEPO-PROVERA CONTRACEPTION: Primary | ICD-10-CM

## 2021-07-08 PROCEDURE — 96372 THER/PROPH/DIAG INJ SC/IM: CPT | Performed by: ADVANCED PRACTICE MIDWIFE

## 2021-07-08 RX ORDER — MEDROXYPROGESTERONE ACETATE 150 MG/ML
150 INJECTION, SUSPENSION INTRAMUSCULAR ONCE
Status: COMPLETED | OUTPATIENT
Start: 2021-07-08 | End: 2021-07-08

## 2021-07-08 RX ADMIN — MEDROXYPROGESTERONE ACETATE 150 MG: 150 INJECTION, SUSPENSION INTRAMUSCULAR at 13:31

## 2021-07-08 SDOH — ECONOMIC STABILITY: TRANSPORTATION INSECURITY
IN THE PAST 12 MONTHS, HAS LACK OF TRANSPORTATION KEPT YOU FROM MEETINGS, WORK, OR FROM GETTING THINGS NEEDED FOR DAILY LIVING?: NO

## 2021-07-08 SDOH — ECONOMIC STABILITY: TRANSPORTATION INSECURITY
IN THE PAST 12 MONTHS, HAS THE LACK OF TRANSPORTATION KEPT YOU FROM MEDICAL APPOINTMENTS OR FROM GETTING MEDICATIONS?: NO

## 2021-07-08 SDOH — ECONOMIC STABILITY: FOOD INSECURITY: WITHIN THE PAST 12 MONTHS, THE FOOD YOU BOUGHT JUST DIDN'T LAST AND YOU DIDN'T HAVE MONEY TO GET MORE.: NEVER TRUE

## 2021-07-08 SDOH — ECONOMIC STABILITY: FOOD INSECURITY: WITHIN THE PAST 12 MONTHS, YOU WORRIED THAT YOUR FOOD WOULD RUN OUT BEFORE YOU GOT MONEY TO BUY MORE.: NEVER TRUE

## 2021-07-08 ASSESSMENT — PATIENT HEALTH QUESTIONNAIRE - PHQ9
SUM OF ALL RESPONSES TO PHQ QUESTIONS 1-9: 0
1. LITTLE INTEREST OR PLEASURE IN DOING THINGS: 0
SUM OF ALL RESPONSES TO PHQ QUESTIONS 1-9: 0
SUM OF ALL RESPONSES TO PHQ QUESTIONS 1-9: 0
SUM OF ALL RESPONSES TO PHQ9 QUESTIONS 1 & 2: 0
2. FEELING DOWN, DEPRESSED OR HOPELESS: 0

## 2021-07-08 ASSESSMENT — SOCIAL DETERMINANTS OF HEALTH (SDOH)
WITHIN THE LAST YEAR, HAVE YOU BEEN AFRAID OF YOUR PARTNER OR EX-PARTNER?: NO
WITHIN THE LAST YEAR, HAVE YOU BEEN HUMILIATED OR EMOTIONALLY ABUSED IN OTHER WAYS BY YOUR PARTNER OR EX-PARTNER?: NO
WITHIN THE LAST YEAR, HAVE YOU BEEN KICKED, HIT, SLAPPED, OR OTHERWISE PHYSICALLY HURT BY YOUR PARTNER OR EX-PARTNER?: NO
WITHIN THE LAST YEAR, HAVE TO BEEN RAPED OR FORCED TO HAVE ANY KIND OF SEXUAL ACTIVITY BY YOUR PARTNER OR EX-PARTNER?: NO
HOW HARD IS IT FOR YOU TO PAY FOR THE VERY BASICS LIKE FOOD, HOUSING, MEDICAL CARE, AND HEATING?: NOT VERY HARD

## 2021-07-08 NOTE — PROGRESS NOTES
After obtaining consent, and per orders of Danielle, injection of medroxyprogesterone given in Right upper quad. gluteus by Leandra Ponce MA. Patient instructed to remain in clinic for 20 minutes afterwards, and to report any adverse reaction to me immediately.

## 2021-07-21 ENCOUNTER — HOSPITAL ENCOUNTER (OUTPATIENT)
Age: 41
Setting detail: SPECIMEN
Discharge: HOME | End: 2021-07-21
Payer: MEDICAID

## 2021-07-21 ENCOUNTER — HOSPITAL ENCOUNTER (OUTPATIENT)
Age: 41
Discharge: HOME | End: 2021-07-21
Payer: MEDICAID

## 2021-07-21 ENCOUNTER — OFFICE VISIT (OUTPATIENT)
Dept: OBGYN CLINIC | Age: 41
End: 2021-07-21
Payer: MEDICAID

## 2021-07-21 VITALS
SYSTOLIC BLOOD PRESSURE: 109 MMHG | DIASTOLIC BLOOD PRESSURE: 63 MMHG | WEIGHT: 231 LBS | HEIGHT: 66 IN | BODY MASS INDEX: 37.12 KG/M2

## 2021-07-21 DIAGNOSIS — Z20.2 POSSIBLE EXPOSURE TO STD: ICD-10-CM

## 2021-07-21 DIAGNOSIS — Z01.419 WELL WOMAN EXAM WITH ROUTINE GYNECOLOGICAL EXAM: Primary | ICD-10-CM

## 2021-07-21 DIAGNOSIS — Z12.31 ENCOUNTER FOR SCREENING MAMMOGRAM FOR MALIGNANT NEOPLASM OF BREAST: ICD-10-CM

## 2021-07-21 PROCEDURE — 80074 ACUTE HEPATITIS PANEL: CPT

## 2021-07-21 PROCEDURE — 86780 TREPONEMA PALLIDUM: CPT

## 2021-07-21 PROCEDURE — 99396 PREV VISIT EST AGE 40-64: CPT | Performed by: ADVANCED PRACTICE MIDWIFE

## 2021-07-21 PROCEDURE — 36415 COLL VENOUS BLD VENIPUNCTURE: CPT

## 2021-07-21 PROCEDURE — 87389 HIV-1 AG W/HIV-1&-2 AB AG IA: CPT

## 2021-07-21 RX ORDER — CLONAZEPAM 0.5 MG/1
TABLET ORAL
COMMUNITY
Start: 2021-07-06

## 2021-07-21 ASSESSMENT — ENCOUNTER SYMPTOMS
EYES NEGATIVE: 1
RESPIRATORY NEGATIVE: 1
GASTROINTESTINAL NEGATIVE: 1
ALLERGIC/IMMUNOLOGIC NEGATIVE: 1

## 2021-07-21 NOTE — PROGRESS NOTES
[facility] AND GYNECOLOGY   [facility] [address] [address] [place] [id number]  Dept: [phone]    Patient Name: Garrett Mays  Patient Age: 39 y.o. Date of Visit: 7/21/2021    Subjective  Chief Complaint   Patient presents with    Gynecologic Exam     per patient she would like to be tested for stds     No LMP recorded. Patient has had an injection. Chaperone for Intimate Exam   Chaperone was offered as part of the rooming process. Patient declined and agrees to continue with exam without a chaperone. HPI  Patient arrives for annual exam.  Patient has concerns today. Concerns include STD testing  Patient reports is  sexually active with 1 male partner(s). Patient denies  pain with sex . Patient reports a history of sexually transmitted infection(s). Patient does want screening for sexually transmitted infection(s). Reports is  on contraception Depo    PHQ Scores 7/8/2021 5/8/2018 3/10/2015   PHQ2 Score 0 0 0   PHQ9 Score 0 0 0     Interpretation of Total Score Depression Severity: 1-4 = Minimal depression, 5-9 = Mild depression, 10-14 = Moderate depression, 15-19 = Moderately severe depression, 20-27 = Severe depression  No flowsheet data found. Interpretation of CECILIA-7 score: 5-9 = mild anxiety, 10-14 = moderate anxiety, 15+ = severe anxiety. Recommend referral to behavioral health for scores 10 or greater. Intimate Partner Violence: Not At Risk    Fear of Current or Ex-Partner: No    Emotionally Abused: No    Physically Abused: No    Sexually Abused: No       Review of Systems   Constitutional: Negative. HENT: Negative. Eyes: Negative. Respiratory: Negative. Cardiovascular: Negative. Gastrointestinal: Negative. Endocrine: Negative. Genitourinary: Negative. Musculoskeletal: Negative. Skin: Negative. Allergic/Immunologic: Negative. Neurological: Negative. Hematological: Negative.     Psychiatric/Behavioral: Negative. Preventive Health Screening:   Date of last pap: 2020 normal            HPV typing/date: 2017  negative  History of Abnormal Paps: yes - had LEEP 2012  Date of last mammogram: na   Date of last DEXA scan: na        Family history of Breast, Ovarian, Colon or Uterine Cancer:  no     If Yes see scanned worksheet    Objective  /63 (Site: Left Upper Arm, Position: Sitting, Cuff Size: Large Adult)   Ht 5' 6\" (1.676 m)   Wt 231 lb (104.8 kg)   BMI 37.28 kg/m²     Physical Exam  Constitutional:       Appearance: She is obese. Assessment & Plan  1. Encounter for screening mammogram for malignant neoplasm of breast    - VERONICA DIGITAL SCREEN W OR WO CAD BILATERAL; Future    2. Possible exposure to STD    - Vaginitis DNA Probe; Future  - HIV Screen; Future  - Hepatitis Panel, Acute; Future  - T. pallidum Ab; Future  - C.trachomatis N.gonorrhoeae DNA, Thin Prep; Future    3. Well woman exam with routine gynecological exam    - PAP Smear; Future      No follow-ups on file. The patient, Neto Hidalgo , was seen with a total time spent of 25 minutes for the visit on this date of service by the [facility]  The time component, involved both face-to-face (counseling and education)  and non face-to-face time (care coordination), spent in determining the total time component. She was also counseled on her preventative health maintenance recommendations and follow-up.     Electronically Signed by ZOHREH Sandoval CNM

## 2021-07-22 DIAGNOSIS — Z20.2 POSSIBLE EXPOSURE TO STD: ICD-10-CM

## 2021-07-22 LAB
DIRECT EXAM: NORMAL
HAV IGM SER IA-ACNC: NONREACTIVE
HEPATITIS B CORE IGM ANTIBODY: NONREACTIVE
HEPATITIS B SURFACE ANTIGEN: NONREACTIVE
HEPATITIS C ANTIBODY: NONREACTIVE
HIV AG/AB: NONREACTIVE
Lab: NORMAL
SPECIMEN DESCRIPTION: NORMAL
T. PALLIDUM, IGG: NONREACTIVE

## 2021-07-23 LAB
CHLAMYDIA BY THIN PREP: NEGATIVE
N. GONORRHOEAE DNA, THIN PREP: NEGATIVE
SPECIMEN DESCRIPTION: NORMAL

## 2021-08-01 LAB — CYTOLOGY REPORT: NORMAL

## 2021-08-28 ENCOUNTER — HOSPITAL ENCOUNTER (EMERGENCY)
Age: 41
Discharge: HOME | End: 2021-08-28
Attending: EMERGENCY MEDICINE
Payer: MEDICAID

## 2021-08-28 VITALS
BODY MASS INDEX: 36.16 KG/M2 | OXYGEN SATURATION: 98 % | RESPIRATION RATE: 14 BRPM | WEIGHT: 225 LBS | HEART RATE: 66 BPM | SYSTOLIC BLOOD PRESSURE: 116 MMHG | HEIGHT: 66 IN | DIASTOLIC BLOOD PRESSURE: 68 MMHG | TEMPERATURE: 98.7 F

## 2021-08-28 DIAGNOSIS — L25.9 CONTACT DERMATITIS, UNSPECIFIED CONTACT DERMATITIS TYPE, UNSPECIFIED TRIGGER: Primary | ICD-10-CM

## 2021-08-28 PROCEDURE — 99284 EMERGENCY DEPT VISIT MOD MDM: CPT

## 2021-08-28 RX ORDER — PREDNISONE 20 MG/1
20 TABLET ORAL 2 TIMES DAILY
Qty: 10 TABLET | Refills: 0 | Status: SHIPPED | OUTPATIENT
Start: 2021-08-28 | End: 2021-09-02

## 2021-08-28 ASSESSMENT — ENCOUNTER SYMPTOMS
CONSTIPATION: 0
FACIAL SWELLING: 0
SHORTNESS OF BREATH: 0
WHEEZING: 0
SORE THROAT: 0
EYE ITCHING: 0
DIARRHEA: 0
TROUBLE SWALLOWING: 0
COUGH: 0
BACK PAIN: 0
COLOR CHANGE: 0
EYE DISCHARGE: 0
ABDOMINAL PAIN: 0
CHEST TIGHTNESS: 0
NAUSEA: 0

## 2021-08-28 NOTE — ED PROVIDER NOTES
[address] [facility] ED  [address] \[facility] [place] [facility]\"" [address]  Phone: [phone]  Fax: [phone]        Pt Name: Bryson Mcclain  MRN: [id number]  [place] 1980  Date of evaluation: 8/28/21      CHIEF COMPLAINT     Chief Complaint   Patient presents with    Rash     bilateral lower extremities         HISTORY OF PRESENT ILLNESS  (Location/Symptom, Timing/Onset, Context/Setting, Quality, Duration, Modifying Factors, Severity.)    Bryson Mcclain is a 39 y.o. female who presents rash to lower extremities and bilateral buttocks. Had this rash for about a month. States about 2 months ago she thought she had bedbugs in her apartment. They did come and do a chemical cleaning of the apartment including the bed and the couch. She said they did not do anything to the recliner. He also has cats in the house. Over the last month she has had some intermittent red spots on the lower extremities that look like some type of an insect bite. More recently she developed a diffuse rash bilateral buttocks that is very itchy. States she has not seen any bedbugs in the apartment since the chemical clean. She denies having any difficulty swallowing or shortness of breath. She lives by herself. She denies any fever chills cough congestion or sore throat. Tried using over-the-counter hydrocortisone cream with no improvement. REVIEW OF SYSTEMS    (2-9 systems for level 4, 10 or more for level 5)     Review of Systems   Constitutional: Negative for appetite change, chills and fever. HENT: Negative for congestion, drooling, facial swelling, sore throat and trouble swallowing. Eyes: Negative for discharge and itching. Respiratory: Negative for cough, chest tightness, shortness of breath and wheezing. Cardiovascular: Negative for chest pain, palpitations and leg swelling. Gastrointestinal: Negative for abdominal pain, constipation, diarrhea and nausea.    Genitourinary: Negative for difficulty urinating and dysuria. Musculoskeletal: Negative for back pain, gait problem, joint swelling, neck pain and neck stiffness. Skin: Positive for rash. Negative for color change and wound. Neurological: Negative for dizziness, light-headedness, numbness and headaches. PAST MEDICAL HISTORY    has a past medical history of Anxiety, Depression, Dizziness, Dog bite, Dysmetabolic syndrome X, Head contusion, Headache(784.0), Insomnia, Insulin resistance, Kidney stones, Kidney stones, Other anxiety states, and Reflux. SURGICAL HISTORY      has a past surgical history that includes LEEP (10/2012); [place] tooth extraction; and Kidney surgery (2004). CURRENTMEDICATIONS       Previous Medications    BUTALBITAL-APAP-CAFFEINE (FIORICET) -40 MG CAPS PER CAPSULE    Take 1 capsule by mouth as needed for Headaches     CALCIUM CARBONATE-VITAMIN D (CALCIUM-VITAMIN D) 500-200 MG-UNIT PER TABLET    Take 1 tablet by mouth 2 times daily (with meals). CLONAZEPAM (KLONOPIN) 0.5 MG TABLET    take 1 tablet by mouth twice a day    FERROUS SULFATE (SLOW FE) 142 (45 FE) MG EXTENDED RELEASE TABLET    Take 142 mg by mouth as needed    FLUOXETINE (PROZAC) 40 MG CAPSULE    50 mg daily per neurologist    MEDROXYPROGESTERONE (DEPO-PROVERA) 150 MG/ML INJECTION    use as directed at physician's office every 3 months    OMEPRAZOLE (PRILOSEC) 20 MG CAPSULE    Take 1 capsule by mouth Daily    TOPIRAMATE (TOPAMAX) 200 MG TABLET    Take 1 tablet by mouth 2 times daily    TRAZODONE (DESYREL) 50 MG TABLET    Take 100 mg by mouth nightly    TURMERIC 500 MG TABS    Take 2 tablets by mouth daily       ALLERGIES     is allergic to shellfish-derived products and zinc.    FAMILY HISTORY     She indicated that the status of her mother is unknown. She indicated that the status of her father is unknown. She indicated that the status of her maternal grandfather is unknown. She indicated that the status of her paternal grandmother is unknown.  She indicated that the status of her paternal grandfather is unknown.     family history includes Alcohol Abuse in her father and paternal grandmother; Cancer in her maternal grandfather; Diabetes in her paternal grandfather; High Blood Pressure in her mother. SOCIAL HISTORY      reports that she has never smoked. She has never used smokeless tobacco. She reports current alcohol use. She reports that she does not use drugs. PHYSICAL EXAM    (up to 7 for level 4, 8 or more for level 5)   INITIAL VITALS:  height is 5' 6\" (1.676 m) and weight is 102.1 kg (225 lb). Her temporal temperature is 98.7 °F (37.1 °C). Her blood pressure is 116/68 and her pulse is 66. Her respiration is 14 and oxygen saturation is 98%. Physical Exam  Constitutional:       General: She is not in acute distress. HENT:      Mouth/Throat:      Mouth: Mucous membranes are moist.      Pharynx: Oropharynx is clear. Uvula midline. No pharyngeal swelling, oropharyngeal exudate, posterior oropharyngeal erythema or uvula swelling. Eyes:      General: Lids are normal.      Conjunctiva/sclera: Conjunctivae normal.   Cardiovascular:      Rate and Rhythm: Normal rate and regular rhythm. Pulses: Normal pulses. Pulmonary:      Effort: No tachypnea, bradypnea, accessory muscle usage, prolonged expiration, respiratory distress or retractions. Breath sounds: Normal breath sounds and air entry. Abdominal:      Palpations: Abdomen is soft. Tenderness: There is no abdominal tenderness. Musculoskeletal:      Cervical back: Normal range of motion and neck supple. Right lower leg: No edema. Left lower leg: No edema. Skin:     General: Skin is warm and dry. Capillary Refill: Capillary refill takes less than 2 seconds. Findings: Rash present. Neurological:      Mental Status: She is alert. GCS: GCS eye subscore is 4. GCS verbal subscore is 5. GCS motor subscore is 6. Sensory: Sensation is intact. Motor: Motor function is intact. Gait: Gait is intact. DIFFERENTIAL DIAGNOSIS/ MDM:     Patient has a rash with no signs of anaphylaxis. This does not appear to be an allergic reaction. This is more of a contact dermatitis. Has had previous exposure to bedbugs but has not seen any bedbugs at her house in the past month. DIAGNOSTIC RESULTS     EKG: All EKG's are interpreted by the Emergency Department Physician who either signs or Co-signs this chart in the absence of a cardiologist.        RADIOLOGY:      Interpretation per the Radiologist below, if available at the time of this note:        LABS:  No results found for this visit on 08/28/21. EMERGENCY DEPARTMENT COURSE:   Vitals:    Vitals:    08/28/21 1500   BP: 116/68   Pulse: 66   Resp: 14   Temp: 98.7 °F (37.1 °C)   TempSrc: Temporal   SpO2: 98%   Weight: 102.1 kg (225 lb)   Height: 5' 6\" (1.676 m)     -------------------------  BP: 116/68, Temp: 98.7 °F (37.1 °C), Pulse: 66, Resp: 14      RE-EVALUATION:      CONSULTS:      PROCEDURES:  None    FINAL IMPRESSION      1. Contact dermatitis, unspecified contact dermatitis type, unspecified trigger          DISPOSITION/PLAN   DISPOSITION Decision To Discharge 08/28/2021 03:50:34 PM      CONDITION ON DISPOSITION:   Stable    PATIENT REFERRED TO:  Jordyn Ariza MD  [address] [place] [address]  [phone]    Call in 2 days        DISCHARGE MEDICATIONS:  New Prescriptions    PREDNISONE (DELTASONE) 20 MG TABLET    Take 1 tablet by mouth 2 times daily for 5 days       (Please note that portions of this note were completed with a voicerecognition program.  Efforts were made to edit the dictations but occasionally words are mis-transcribed. )    Rachel Lagos DO, , MD, F.A.C.E.P.   Attending Emergency Medicine Physician        Santos Peñaloza DO  08/28/21 [id number]

## 2021-09-03 DIAGNOSIS — Z30.42 DEPOT CONTRACEPTION: ICD-10-CM

## 2021-09-03 NOTE — TELEPHONE ENCOUNTER
Isela Mcmanus is requesting a refill on Depo.    Last Annual visit:  07/21/21  Last OB visit: last depo 07/08/21  Next OB/Office visit:  09/23/21  Last prescribing provider:  Ephraim Phalen, CNM

## 2021-09-08 RX ORDER — MEDROXYPROGESTERONE ACETATE 150 MG/ML
INJECTION, SUSPENSION INTRAMUSCULAR
Qty: 1 ML | Refills: 3 | Status: SHIPPED | OUTPATIENT
Start: 2021-09-08 | End: 2022-08-05 | Stop reason: SDUPTHER

## 2021-09-29 ENCOUNTER — NURSE ONLY (OUTPATIENT)
Dept: OBGYN CLINIC | Age: 41
End: 2021-09-29
Payer: MEDICAID

## 2021-09-29 VITALS
HEIGHT: 66 IN | HEART RATE: 72 BPM | DIASTOLIC BLOOD PRESSURE: 73 MMHG | WEIGHT: 239.2 LBS | BODY MASS INDEX: 38.44 KG/M2 | SYSTOLIC BLOOD PRESSURE: 104 MMHG

## 2021-09-29 DIAGNOSIS — N94.6 DYSMENORRHEA: Primary | ICD-10-CM

## 2021-09-29 PROCEDURE — 99211 OFF/OP EST MAY X REQ PHY/QHP: CPT | Performed by: ADVANCED PRACTICE MIDWIFE

## 2021-09-29 PROCEDURE — 96372 THER/PROPH/DIAG INJ SC/IM: CPT | Performed by: ADVANCED PRACTICE MIDWIFE

## 2021-09-29 RX ORDER — DOXYCYCLINE 40 MG/1
40 CAPSULE ORAL EVERY MORNING
COMMUNITY

## 2021-09-29 RX ORDER — MEDROXYPROGESTERONE ACETATE 150 MG/ML
150 INJECTION, SUSPENSION INTRAMUSCULAR ONCE
Status: COMPLETED | OUTPATIENT
Start: 2021-09-29 | End: 2021-09-29

## 2021-09-29 RX ADMIN — MEDROXYPROGESTERONE ACETATE 150 MG: 150 INJECTION, SUSPENSION INTRAMUSCULAR at 11:42

## 2021-09-29 NOTE — PROGRESS NOTES
After obtaining consent, and per orders of Marcin Butterfield CNM, injection of medroxyprogesterone given in Right upper quad. gluteus by Minus Reasons. Patient instructed to remain in clinic for 20 minutes afterwards, and to report any adverse reaction to me immediately.

## 2021-12-16 ENCOUNTER — NURSE ONLY (OUTPATIENT)
Dept: OBGYN CLINIC | Age: 41
End: 2021-12-16
Payer: MEDICAID

## 2021-12-16 VITALS — DIASTOLIC BLOOD PRESSURE: 74 MMHG | WEIGHT: 235.3 LBS | BODY MASS INDEX: 37.98 KG/M2 | SYSTOLIC BLOOD PRESSURE: 118 MMHG

## 2021-12-16 DIAGNOSIS — Z30.42 ENCOUNTER FOR DEPO-PROVERA CONTRACEPTION: Primary | ICD-10-CM

## 2021-12-16 PROCEDURE — 96372 THER/PROPH/DIAG INJ SC/IM: CPT | Performed by: ADVANCED PRACTICE MIDWIFE

## 2021-12-16 RX ORDER — PHENTERMINE HYDROCHLORIDE 37.5 MG/1
37.5 CAPSULE ORAL EVERY MORNING
COMMUNITY

## 2021-12-16 RX ORDER — MEDROXYPROGESTERONE ACETATE 150 MG/ML
150 INJECTION, SUSPENSION INTRAMUSCULAR ONCE
Status: COMPLETED | OUTPATIENT
Start: 2021-12-16 | End: 2021-12-16

## 2021-12-16 RX ADMIN — MEDROXYPROGESTERONE ACETATE 150 MG: 150 INJECTION, SUSPENSION INTRAMUSCULAR at 14:14

## 2022-03-02 ENCOUNTER — NURSE ONLY (OUTPATIENT)
Dept: OBGYN CLINIC | Age: 42
End: 2022-03-02
Payer: MEDICAID

## 2022-03-02 VITALS
DIASTOLIC BLOOD PRESSURE: 76 MMHG | WEIGHT: 237.8 LBS | SYSTOLIC BLOOD PRESSURE: 111 MMHG | HEART RATE: 78 BPM | HEIGHT: 66 IN | BODY MASS INDEX: 38.22 KG/M2

## 2022-03-02 DIAGNOSIS — N94.6 DYSMENORRHEA: Primary | ICD-10-CM

## 2022-03-02 PROCEDURE — 96372 THER/PROPH/DIAG INJ SC/IM: CPT | Performed by: ADVANCED PRACTICE MIDWIFE

## 2022-03-02 RX ORDER — MEDROXYPROGESTERONE ACETATE 150 MG/ML
150 INJECTION, SUSPENSION INTRAMUSCULAR ONCE
Status: COMPLETED | OUTPATIENT
Start: 2022-03-02 | End: 2022-03-02

## 2022-03-02 RX ADMIN — MEDROXYPROGESTERONE ACETATE 150 MG: 150 INJECTION, SUSPENSION INTRAMUSCULAR at 14:36

## 2022-03-02 NOTE — PROGRESS NOTES
After obtaining consent, and per orders of Tylor Sellers CNM, injection of medroxyprogesterone given in Right upper quad. gluteus by Katheryne Saint. Patient instructed to remain in clinic for 20 minutes afterwards, and to report any adverse reaction to me immediately.

## 2022-05-18 ENCOUNTER — NURSE ONLY (OUTPATIENT)
Dept: OBGYN CLINIC | Age: 42
End: 2022-05-18
Payer: MEDICAID

## 2022-05-18 VITALS
HEIGHT: 66 IN | HEART RATE: 80 BPM | DIASTOLIC BLOOD PRESSURE: 78 MMHG | WEIGHT: 244.3 LBS | BODY MASS INDEX: 39.26 KG/M2 | SYSTOLIC BLOOD PRESSURE: 117 MMHG

## 2022-05-18 DIAGNOSIS — N94.6 DYSMENORRHEA: Primary | ICD-10-CM

## 2022-05-18 PROCEDURE — 96372 THER/PROPH/DIAG INJ SC/IM: CPT | Performed by: ADVANCED PRACTICE MIDWIFE

## 2022-05-18 RX ORDER — MEDROXYPROGESTERONE ACETATE 150 MG/ML
150 INJECTION, SUSPENSION INTRAMUSCULAR ONCE
Status: COMPLETED | OUTPATIENT
Start: 2022-05-18 | End: 2022-05-18

## 2022-05-18 RX ORDER — IBUPROFEN 800 MG/1
800 TABLET ORAL EVERY 6 HOURS PRN
COMMUNITY

## 2022-05-18 RX ADMIN — MEDROXYPROGESTERONE ACETATE 150 MG: 150 INJECTION, SUSPENSION INTRAMUSCULAR at 14:15

## 2022-05-18 NOTE — PROGRESS NOTES
After obtaining consent, and per orders of Rosa [place] CNM, injection of medroxyprogesterone given in Right upper quad. gluteus by Paramjit Guajardo. Patient instructed to remain in clinic for 20 minutes afterwards, and to report any adverse reaction to me immediately.

## 2022-08-03 DIAGNOSIS — Z30.42 DEPOT CONTRACEPTION: ICD-10-CM

## 2022-08-04 DIAGNOSIS — Z30.42 DEPOT CONTRACEPTION: ICD-10-CM

## 2022-08-04 NOTE — TELEPHONE ENCOUNTER
Pt called states needs depo refilled for appointment tomorrow. Pt states she attempted to have Rx filled and was told it was completed but pharmacy has no RX for her.      Last Annual visit:  7/21/21  Next Office visit:  8/5/22    Currently Pregnant no  Last OB visit: na  Next OB visit: na    Last prescribing provider:  Anders Zapata

## 2022-08-05 ENCOUNTER — NURSE ONLY (OUTPATIENT)
Dept: OBGYN CLINIC | Age: 42
End: 2022-08-05
Payer: MEDICAID

## 2022-08-05 VITALS
SYSTOLIC BLOOD PRESSURE: 107 MMHG | BODY MASS INDEX: 40.39 KG/M2 | HEART RATE: 78 BPM | WEIGHT: 242.4 LBS | DIASTOLIC BLOOD PRESSURE: 72 MMHG | HEIGHT: 65 IN

## 2022-08-05 DIAGNOSIS — N94.6 DYSMENORRHEA: Primary | ICD-10-CM

## 2022-08-05 RX ORDER — MEDROXYPROGESTERONE ACETATE 150 MG/ML
150 INJECTION, SUSPENSION INTRAMUSCULAR ONCE
Status: COMPLETED | OUTPATIENT
Start: 2022-08-05 | End: 2022-08-08

## 2022-08-05 RX ORDER — MEDROXYPROGESTERONE ACETATE 150 MG/ML
INJECTION, SUSPENSION INTRAMUSCULAR
Qty: 1 ML | Refills: 0 | Status: SHIPPED | OUTPATIENT
Start: 2022-08-05 | End: 2022-10-25

## 2022-08-08 PROCEDURE — 96372 THER/PROPH/DIAG INJ SC/IM: CPT | Performed by: ADVANCED PRACTICE MIDWIFE

## 2022-08-08 RX ADMIN — MEDROXYPROGESTERONE ACETATE 150 MG: 150 INJECTION, SUSPENSION INTRAMUSCULAR at 10:07

## 2022-08-11 RX ORDER — MEDROXYPROGESTERONE ACETATE 150 MG/ML
INJECTION, SUSPENSION INTRAMUSCULAR
Qty: 1 ML | Refills: 0 | OUTPATIENT
Start: 2022-08-11

## 2022-10-23 DIAGNOSIS — Z30.42 DEPOT CONTRACEPTION: ICD-10-CM

## 2022-10-24 NOTE — TELEPHONE ENCOUNTER
Katie Nicolette is requesting a refill on depo.      Last Annual visit:  07/21/21  Next Office visit:  none on file, lmom    Currently Pregnant no  Last OB visit: n/a  Next OB visit: n/a    Last prescribing provider:  laura peters

## 2022-10-25 RX ORDER — MEDROXYPROGESTERONE ACETATE 150 MG/ML
INJECTION, SUSPENSION INTRAMUSCULAR
Qty: 1 ML | Refills: 0 | Status: SHIPPED | OUTPATIENT
Start: 2022-10-25

## 2022-11-08 ENCOUNTER — NURSE ONLY (OUTPATIENT)
Dept: OBGYN CLINIC | Age: 42
End: 2022-11-08
Payer: MEDICAID

## 2022-11-08 VITALS
SYSTOLIC BLOOD PRESSURE: 120 MMHG | DIASTOLIC BLOOD PRESSURE: 86 MMHG | HEIGHT: 66 IN | WEIGHT: 236.9 LBS | BODY MASS INDEX: 38.07 KG/M2

## 2022-11-08 DIAGNOSIS — Z30.42 SURVEILLANCE OF CONTRACEPTIVE INJECTION: Primary | ICD-10-CM

## 2022-11-08 LAB
CONTROL: YES
PREGNANCY TEST URINE, POC: NEGATIVE

## 2022-11-08 PROCEDURE — 81025 URINE PREGNANCY TEST: CPT | Performed by: ADVANCED PRACTICE MIDWIFE

## 2022-11-08 PROCEDURE — 96372 THER/PROPH/DIAG INJ SC/IM: CPT | Performed by: ADVANCED PRACTICE MIDWIFE

## 2022-11-08 RX ORDER — BUTALBITAL, ACETAMINOPHEN AND CAFFEINE 50; 325; 40 MG/1; MG/1; MG/1
TABLET ORAL
COMMUNITY
Start: 2022-10-27

## 2022-11-08 RX ORDER — PHENTERMINE HYDROCHLORIDE 37.5 MG/1
TABLET ORAL
COMMUNITY
Start: 2022-10-06

## 2022-11-08 RX ORDER — FLUOXETINE HYDROCHLORIDE 20 MG/1
CAPSULE ORAL
COMMUNITY
Start: 2022-11-07 | End: 2022-11-08 | Stop reason: ALTCHOICE

## 2022-11-08 RX ORDER — MEDROXYPROGESTERONE ACETATE 150 MG/ML
150 INJECTION, SUSPENSION INTRAMUSCULAR ONCE
Status: COMPLETED | OUTPATIENT
Start: 2022-11-08 | End: 2022-11-08

## 2022-11-08 RX ADMIN — MEDROXYPROGESTERONE ACETATE 150 MG: 150 INJECTION, SUSPENSION INTRAMUSCULAR at 12:17

## 2022-11-08 NOTE — PROGRESS NOTES
Date last pap: July 2021    Last Depo-Provera: 8/5/22    Side Effects if any: none    Serum HCG indicated? Yes- late for injection& UPT(-) with QC present    Depo-Provera 150 mg IM given by: LARISSA Sahu LPN in Rt Upper Glut w/out difficulty    Next appointment due in 12wks and needs annual with or before.

## 2023-01-12 DIAGNOSIS — Z30.42 DEPOT CONTRACEPTION: ICD-10-CM

## 2023-01-12 RX ORDER — MEDROXYPROGESTERONE ACETATE 150 MG/ML
INJECTION, SUSPENSION INTRAMUSCULAR
Qty: 1 ML | Refills: 0 | OUTPATIENT
Start: 2023-01-12

## 2023-01-30 DIAGNOSIS — Z30.42 DEPOT CONTRACEPTION: ICD-10-CM

## 2023-01-30 NOTE — TELEPHONE ENCOUNTER
Janell Michaels is requesting a refill on depo.      Next depo scheduled 2/2/23    Last Annual visit:  7/21/21  Next Office visit:  3/2/23    Currently Pregnant no      Last prescribing provider:  Arminda Short CNM

## 2023-01-31 RX ORDER — MEDROXYPROGESTERONE ACETATE 150 MG/ML
INJECTION, SUSPENSION INTRAMUSCULAR
Qty: 1 ML | Refills: 0 | Status: SHIPPED | OUTPATIENT
Start: 2023-01-31

## 2023-02-02 ENCOUNTER — NURSE ONLY (OUTPATIENT)
Dept: OBGYN CLINIC | Age: 43
End: 2023-02-02
Payer: MEDICAID

## 2023-02-02 VITALS
HEIGHT: 66 IN | DIASTOLIC BLOOD PRESSURE: 70 MMHG | SYSTOLIC BLOOD PRESSURE: 116 MMHG | WEIGHT: 230 LBS | BODY MASS INDEX: 36.96 KG/M2

## 2023-02-02 DIAGNOSIS — N94.6 DYSMENORRHEA: ICD-10-CM

## 2023-02-02 DIAGNOSIS — Z30.42 SURVEILLANCE OF CONTRACEPTIVE INJECTION: Primary | ICD-10-CM

## 2023-02-02 PROCEDURE — 96372 THER/PROPH/DIAG INJ SC/IM: CPT | Performed by: ADVANCED PRACTICE MIDWIFE

## 2023-02-02 RX ORDER — MEDROXYPROGESTERONE ACETATE 150 MG/ML
150 INJECTION, SUSPENSION INTRAMUSCULAR ONCE
Status: COMPLETED | OUTPATIENT
Start: 2023-02-02 | End: 2023-02-02

## 2023-02-02 RX ADMIN — MEDROXYPROGESTERONE ACETATE 150 MG: 150 INJECTION, SUSPENSION INTRAMUSCULAR at 15:39

## 2023-02-02 NOTE — PROGRESS NOTES
After obtaining consent, and per orders of  Merlin Shelling Linkey,CNM injection of depo provera given in Left deltoid by Oh Shoemaker MA.  Patient instructed to remain in clinic for 20 minutes afterwards, and to report any adverse reaction to me immediately       [id number]  [id number]  EXP-10/2024

## 2023-04-04 ENCOUNTER — HOSPITAL ENCOUNTER (OUTPATIENT)
Age: 43
Setting detail: SPECIMEN
Discharge: HOME | End: 2023-04-04

## 2023-04-04 ENCOUNTER — OFFICE VISIT (OUTPATIENT)
Dept: OBGYN CLINIC | Age: 43
End: 2023-04-04

## 2023-04-04 VITALS
HEIGHT: 66 IN | BODY MASS INDEX: 39.21 KG/M2 | DIASTOLIC BLOOD PRESSURE: 72 MMHG | WEIGHT: 244 LBS | SYSTOLIC BLOOD PRESSURE: 120 MMHG

## 2023-04-04 DIAGNOSIS — Z01.419 WELL WOMAN EXAM WITH ROUTINE GYNECOLOGICAL EXAM: Primary | ICD-10-CM

## 2023-04-04 DIAGNOSIS — Z12.31 SCREENING MAMMOGRAM, ENCOUNTER FOR: ICD-10-CM

## 2023-04-04 DIAGNOSIS — Z30.42 SURVEILLANCE FOR DEPO-PROVERA CONTRACEPTION: ICD-10-CM

## 2023-04-04 RX ORDER — MEDROXYPROGESTERONE ACETATE 150 MG/ML
150 INJECTION, SUSPENSION INTRAMUSCULAR ONCE
Qty: 1 ML | Refills: 3
Start: 2023-04-04 | End: 2023-04-04

## 2023-04-04 NOTE — PROGRESS NOTES
[facility]YNECOLOGY  [address] [place] [address] [address].  Saul Choudhury [address] [place] [id number]  Dept: [phone]        DATE OF VISIT:  23        History and Physical    Scooter Romero    :  1980  CHIEF COMPLAINT:    Chief Complaint   Patient presents with    Annual Exam                    Scooter Romero is a 43 y.o. female who presents for annual well woman exam.      The patient was seen and examined. Per the patient bowels are regular. She has no voiding complaints. She denies any bloating as well as vaginal discharge. Chaperone for Intimate Exam  Chaperone was offered as part of the rooming process. Patient declined and agrees to continue with exam without a chaperone.   Chaperone: none     _____________________________________________________________________  Past Medical History:   Diagnosis Date    Anxiety     Depression     Dizziness     Dog bite     right cheek and lip    Dysmetabolic syndrome X     Head contusion     Headache(784.0)     Insomnia     Insulin resistance 2011    Kidney stones     Kidney stones     Other anxiety states 2011    Reflux                                                                    Past Surgical History:   Procedure Laterality Date    KIDNEY SURGERY      stent    LEEP  10/2012    WISDOM TOOTH EXTRACTION       Family History   Problem Relation Age of Onset    High Blood Pressure Mother     Alcohol Abuse Father     Cancer Maternal Grandfather         esophageal    Alcohol Abuse Paternal Grandmother     Diabetes Paternal Grandfather      Social History     Tobacco Use   Smoking Status Never   Smokeless Tobacco Never     Social History     Substance and Sexual Activity   Alcohol Use Yes    Alcohol/week: 0.0 standard drinks    Comment: rarely     Current Outpatient Medications   Medication Sig Dispense Refill    medroxyPROGESTERone (DEPO-PROVERA) 150 MG/ML injection Inject 1 mL into

## 2023-04-06 LAB
HPV SAMPLE: NORMAL
HPV, GENOTYPE 16: NOT DETECTED
HPV, GENOTYPE 18: NOT DETECTED
HPV, HIGH RISK OTHER: NOT DETECTED
HPV, INTERPRETATION: NORMAL
SPECIMEN DESCRIPTION: NORMAL

## 2023-04-21 DIAGNOSIS — Z30.42 DEPOT CONTRACEPTION: ICD-10-CM

## 2023-04-24 ENCOUNTER — NURSE ONLY (OUTPATIENT)
Dept: OBGYN CLINIC | Age: 43
End: 2023-04-24
Payer: MEDICAID

## 2023-04-24 DIAGNOSIS — N94.6 DYSMENORRHEA: ICD-10-CM

## 2023-04-24 DIAGNOSIS — Z30.42 SURVEILLANCE FOR DEPO-PROVERA CONTRACEPTION: Primary | ICD-10-CM

## 2023-04-24 PROCEDURE — 96372 THER/PROPH/DIAG INJ SC/IM: CPT | Performed by: ADVANCED PRACTICE MIDWIFE

## 2023-04-24 RX ORDER — MEDROXYPROGESTERONE ACETATE 150 MG/ML
150 INJECTION, SUSPENSION INTRAMUSCULAR ONCE
Status: COMPLETED | OUTPATIENT
Start: 2023-04-24 | End: 2023-04-24

## 2023-04-24 RX ADMIN — MEDROXYPROGESTERONE ACETATE 150 MG: 150 INJECTION, SUSPENSION INTRAMUSCULAR at 14:16

## 2023-04-24 NOTE — PROGRESS NOTES
After obtaining consent, and per orders of  Joseph Bravo,CNM injection of Depo Provera given in Left vastus lateralis by Corinne Krebs, MA.  Patient instructed to remain in clinic for 20 minutes afterwards, and to report any adverse reaction to me immediately     [address] [phone]  LOT [id number]  EXP 01/2025

## 2023-04-27 ENCOUNTER — PROCEDURE VISIT (OUTPATIENT)
Dept: OBGYN CLINIC | Age: 43
End: 2023-04-27

## 2023-04-27 ENCOUNTER — HOSPITAL ENCOUNTER (OUTPATIENT)
Age: 43
Setting detail: SPECIMEN
Discharge: HOME | End: 2023-04-27

## 2023-04-27 VITALS
BODY MASS INDEX: 39.37 KG/M2 | HEART RATE: 96 BPM | HEIGHT: 66 IN | WEIGHT: 245 LBS | SYSTOLIC BLOOD PRESSURE: 131 MMHG | DIASTOLIC BLOOD PRESSURE: 87 MMHG

## 2023-04-27 DIAGNOSIS — R87.613 HSIL (HIGH GRADE SQUAMOUS INTRAEPITHELIAL LESION) ON PAP SMEAR OF CERVIX: Primary | ICD-10-CM

## 2023-04-27 LAB
CONTROL: PRESENT
PREGNANCY TEST URINE, POC: NEGATIVE

## 2023-04-27 RX ORDER — MELOXICAM 7.5 MG/1
7.5 TABLET ORAL EVERY MORNING
COMMUNITY
Start: 2023-04-13

## 2023-04-27 RX ORDER — CYCLOBENZAPRINE HCL 5 MG
TABLET ORAL
COMMUNITY
Start: 2023-04-13

## 2023-04-27 NOTE — PROGRESS NOTES
[facility]  [address]      Procedure Note    Carmelo Hillman  4/27/2023                       Primary Care Physician: Vadim Ford MD      Subjective:   Carmelo Hillman 43 y.o. female Niki Ortiz is here for previously scheduled Colposcopy due to history of HSIL found on pap smear 4/4/23. No LMP recorded. Patient has had an injection. . She has no complaints today. Pregnancy test was negative. She had a colpo done in 2012. Vitals:   Vitals:    04/27/23 1328   BP: 131/87   Site: Right Upper Arm   Position: Sitting   Cuff Size: Large Adult   Pulse: 96   Weight: 245 lb (111.1 kg)   Height: 5' 6\" (1.676 m)         Procedure: Colposcopy, biopsies and ECC     Indications: HSIL    Procedure Details: The patient was counseled on the procedure. Risks, benefits and alternatives were reviewed. The patient is aware that this is diagnostic and not curative and a second procedure may be needed. A consent was reviewed and obtained. Chaperone: Ashley Valencia MA     Colposcopy w/ Biopsies and Endocervical Curettage  A sterile speculum was placed into the vagina and the cervix was identified. The colposcope was positioned and the cervix was examined. On gross visualization her cervical os was abnormal appearing. Questionable cervical erosion verse ectropion. Tissue was very fluffy, pink and friable appearing; very distinct from the rest of the cervix. Acetoacetic acid was applied to the cervix, revealing acetowhite changes in a jessika pattern from 5-7oclock. Just with application of vinegar, cervix bled. Lugol's Iodine was applied to the cervix revealing decreased uptake in this area as well as a small section at  12oclock. The exam was considered to be satisfactory with the transformation zone visualized. Biopsies were taken at 15, 5, 7 oclock. The 12 oclock biopsy was from the questionable area of erosion vs ectropion.  Silver nitrate sticks were used for
3

## 2023-04-30 RX ORDER — MEDROXYPROGESTERONE ACETATE 150 MG/ML
INJECTION, SUSPENSION INTRAMUSCULAR
Qty: 1 ML | Refills: 0 | OUTPATIENT
Start: 2023-04-30

## 2023-05-02 LAB — SURGICAL PATHOLOGY REPORT: NORMAL

## 2023-05-03 PROBLEM — D06.9 HIGH GRADE SQUAMOUS INTRAEPITHELIAL LESION (HGSIL), GRADE 3 CIN, ON BIOPSY OF CERVIX: Status: ACTIVE | Noted: 2023-05-03

## 2023-05-25 ENCOUNTER — INITIAL CONSULT (OUTPATIENT)
Dept: OBGYN CLINIC | Age: 43
End: 2023-05-25

## 2023-05-25 VITALS
SYSTOLIC BLOOD PRESSURE: 119 MMHG | HEIGHT: 66 IN | DIASTOLIC BLOOD PRESSURE: 80 MMHG | BODY MASS INDEX: 39.21 KG/M2 | HEART RATE: 94 BPM | WEIGHT: 244 LBS

## 2023-05-25 DIAGNOSIS — N87.1 DYSPLASIA OF CERVIX, HIGH GRADE CIN 2: ICD-10-CM

## 2023-05-25 DIAGNOSIS — D06.9 HIGH GRADE SQUAMOUS INTRAEPITHELIAL LESION (HGSIL), GRADE 3 CIN, ON BIOPSY OF CERVIX: Primary | ICD-10-CM

## 2023-05-25 RX ORDER — PREGABALIN 25 MG/1
CAPSULE ORAL
COMMUNITY
Start: 2023-05-24

## 2023-05-25 NOTE — PROGRESS NOTES
Sleep disturbance 10/28/2013    Abnormal weight gain 04/19/2012    Insulin resistance 11/19/2011    Other anxiety states 11/19/2011          Orders:  Hospital: [facility]   CBC:No                                                           NPO after midnight   Type & Screen:No  PAT Appointment:Yes   Anesthesia Protocol: Yes  Urinalysis: No                                                    PT/PTT: No    UrineHCG: Yes                                                  Liver Function Studies: No     CMP: No                                                                BMP: No     EKG: No                                                                 Chest X-ray: No     COVID testing: N/A                                Bowel Prep Instructions: No  Other Special Instructions: N/A  Antibiotics within one hour prior to surgery: No  Heparin 5000 units subcutaneously one hour before surgery: No  Consults/Notification: No   Medical Clearance From: No   H&P: not done  Consent: done    Counseling: The patient was counseled on all options both medical and surgical, conservative as well as definitive. She has elected to proceed with the procedure as stated above. The patient was counseled on the procedure. Risks and complications were reviewed in detail. The patients orders, labs, consents have been completed. The history and physical as well as all supporting surgical documentation will be forwarded to the pre-operative holding area.       Kaleigh Kevin DO  [facility]   5/26/2023, 11:33 AM

## 2023-06-02 ENCOUNTER — TELEPHONE (OUTPATIENT)
Dept: OBGYN CLINIC | Age: 43
End: 2023-06-02

## 2023-06-02 NOTE — TELEPHONE ENCOUNTER
Surgery scheduled. Lvm of date and time. Date: 6/26/2023  Time: 12:40 pm needs to be there 10:40 am, nothing to eat or drink 8-10 prior to surgery, pt will need a 4 week post-op visit. PAT If needed:6/15/2023 1:00 pm at .'s         Informed to arrive 2 hours prior to scheduled start time. No eating or drinking 8 hours prior. Patient aware and in agreement with plan.     Maico Garcia

## 2023-06-12 RX ORDER — SODIUM CHLORIDE, SODIUM LACTATE, POTASSIUM CHLORIDE, CALCIUM CHLORIDE 600; 310; 30; 20 MG/100ML; MG/100ML; MG/100ML; MG/100ML
INJECTION, SOLUTION INTRAVENOUS CONTINUOUS
OUTPATIENT
Start: 2023-06-12

## 2023-06-12 NOTE — DISCHARGE INSTRUCTIONS
Pre-operative Instructions    Please arrive at the surgery center by 10:40 AM on 6/26/2023  (or as directed by your surgeon's office). See Directons to Surgery Center below. FASTING    NOTHING TO EAT OR DRINK AFTER MIDNIGHT the night prior to surgery (This includes gum, candy, mints, chewing tobacco, etc). MEDICATIONS    What to STOP: ANY BLOOD THINNING MEDICATION(S) as directed by your surgeon or prescribing physician. FAILURE TO STOP CERTAIN MEDICATIONS MAY INTERFERE WITH YOUR SCHEDULED SURGERY. According to the medication list you provided today, PLEASE STOP: ibuprofen (Motrin), meloxicam (Mobic)    2. What to CONTINUE leading up to your surgery:   Please take all your other daily medications except the medications listed above that you were instructed to hold. 3. What to TAKE MORNING OF SURGERY with SMALL SIP OF WATER: topamax, klonopin, omeprazole (Prilosec)                       IF APPLICABLE:  -If you have been given a blood band, you must bring it with you the day of surgery, unclasped.  -Use routine inhalers and bring inhalers the day of surgery.   -Bring C-Pap/Bi-pap with you morning of surgery if planning on staying in the hospital overnight.  -Do not take diabetic medications on the day of surgery. OTHER IMPORTANT REMINDERS    1) You may be required to provide a urine sample upon your arrival to the pre-op area, so please take this into consideration. 2) If  NOT planning on staying in the hospital overnight : A. You will need an adult family member /friend to drive you home after your procedure. Taxi cabs or any form of public transportation ALONE is not acceptable.   -Your  must be 25years of age or older and able to sign off on your discharge instructions.     -It is preferable that the friend or family member stay at the hospital throughout your procedure.   Letty Stanley must remain with you once you have arrived home for

## 2023-06-15 ENCOUNTER — HOSPITAL ENCOUNTER (OUTPATIENT)
Dept: PREADMISSION TESTING | Age: 43
Discharge: HOME | End: 2023-06-19
Payer: MEDICAID

## 2023-06-15 VITALS
HEIGHT: 66 IN | WEIGHT: 242 LBS | TEMPERATURE: 97.7 F | SYSTOLIC BLOOD PRESSURE: 125 MMHG | RESPIRATION RATE: 20 BRPM | HEART RATE: 83 BPM | OXYGEN SATURATION: 100 % | BODY MASS INDEX: 38.89 KG/M2 | DIASTOLIC BLOOD PRESSURE: 85 MMHG

## 2023-06-15 LAB
ANION GAP SERPL CALCULATED.3IONS-SCNC: 9 MMOL/L (ref 9–17)
BUN SERPL-MCNC: 8 MG/DL (ref 6–20)
CHLORIDE SERPL-SCNC: 106 MMOL/L (ref 98–107)
CO2 SERPL-SCNC: 24 MMOL/L (ref 20–31)
CREAT SERPL-MCNC: 0.88 MG/DL (ref 0.5–0.9)
ERYTHROCYTE [DISTWIDTH] IN BLOOD BY AUTOMATED COUNT: 13.8 % (ref 11.8–14.4)
GFR SERPL CREATININE-BSD FRML MDRD: >60 ML/MIN/1.73M2
GLUCOSE SERPL-MCNC: 87 MG/DL (ref 70–99)
HCG(URINE) PREGNANCY TEST: NEGATIVE
HCT VFR BLD AUTO: 41.5 % (ref 36.3–47.1)
HGB BLD-MCNC: 13.5 G/DL (ref 11.9–15.1)
MCH RBC QN AUTO: 29.2 PG (ref 25.2–33.5)
MCHC RBC AUTO-ENTMCNC: 32.5 G/DL (ref 28.4–34.8)
MCV RBC AUTO: 89.6 FL (ref 82.6–102.9)
NRBC AUTOMATED: 0 PER 100 WBC
PLATELET # BLD AUTO: 270 K/UL (ref 138–453)
PMV BLD AUTO: 9.3 FL (ref 8.1–13.5)
POTASSIUM SERPL-SCNC: 3.9 MMOL/L (ref 3.7–5.3)
RBC # BLD AUTO: 4.63 M/UL (ref 3.95–5.11)
SODIUM SERPL-SCNC: 139 MMOL/L (ref 135–144)
WBC OTHER # BLD: 11 K/UL (ref 3.5–11.3)

## 2023-06-15 PROCEDURE — 36415 COLL VENOUS BLD VENIPUNCTURE: CPT

## 2023-06-15 PROCEDURE — 85027 COMPLETE CBC AUTOMATED: CPT

## 2023-06-15 PROCEDURE — 84520 ASSAY OF UREA NITROGEN: CPT

## 2023-06-15 PROCEDURE — 82565 ASSAY OF CREATININE: CPT

## 2023-06-15 PROCEDURE — 81025 URINE PREGNANCY TEST: CPT

## 2023-06-15 PROCEDURE — 80051 ELECTROLYTE PANEL: CPT

## 2023-06-15 PROCEDURE — 82947 ASSAY GLUCOSE BLOOD QUANT: CPT

## 2023-06-15 RX ORDER — FEXOFENADINE HCL 180 MG/1
180 TABLET ORAL DAILY
COMMUNITY

## 2023-06-15 NOTE — PROGRESS NOTES
7/27/2021 - F/U 1 yr    Under care of team     NEUROLOGY - Dr. Mckenzie Cerda - last visit - \"it's been awhile. \"    Under care of team     OB/GYN - Dr. Eyad Márquez - last visit - 5/25/2023    Under care of team     RHEUMATOLOGY - Dr. Danyell Darling - last visit 5/24/2023    Under care of team     PSYCHOLOGY - Dr. Halie Damon - last visit 6/2023 - sees \"every couple of weeks\"         Patient was evaluated in PAT & anesthesia guidelines were applied. NPO guidelines, medication instructions and scheduled arrival time were reviewed with patient. Advised patient or guardian to please notify surgeon's office if patient or child becomes ill prior to surgery. Anesthesia contacted:  no    Medical or cardiac clearance ordered: no, METS > 4, patient denies any cardiopulmonary complaints.      Antwon Muniz, ZOHREH - CNP   6/15/23  2:10 PM

## 2023-06-25 PROBLEM — Z98.890 POSTOPERATIVE STATE: Status: ACTIVE | Noted: 2023-06-25

## 2023-06-26 ENCOUNTER — HOSPITAL ENCOUNTER (OUTPATIENT)
Age: 43
Setting detail: OUTPATIENT SURGERY
Discharge: HOME | End: 2023-06-26
Attending: STUDENT IN AN ORGANIZED HEALTH CARE EDUCATION/TRAINING PROGRAM | Admitting: STUDENT IN AN ORGANIZED HEALTH CARE EDUCATION/TRAINING PROGRAM
Payer: MEDICAID

## 2023-06-26 ENCOUNTER — ANESTHESIA EVENT (OUTPATIENT)
Dept: OPERATING ROOM | Age: 43
End: 2023-06-26
Payer: MEDICAID

## 2023-06-26 ENCOUNTER — ANESTHESIA (OUTPATIENT)
Dept: OPERATING ROOM | Age: 43
End: 2023-06-26
Payer: MEDICAID

## 2023-06-26 VITALS
TEMPERATURE: 98 F | WEIGHT: 243.5 LBS | OXYGEN SATURATION: 98 % | BODY MASS INDEX: 39.13 KG/M2 | HEART RATE: 67 BPM | HEIGHT: 66 IN | DIASTOLIC BLOOD PRESSURE: 66 MMHG | SYSTOLIC BLOOD PRESSURE: 122 MMHG | RESPIRATION RATE: 17 BRPM

## 2023-06-26 DIAGNOSIS — D06.9 CIN III (CERVICAL INTRAEPITHELIAL NEOPLASIA GRADE III) WITH SEVERE DYSPLASIA: ICD-10-CM

## 2023-06-26 DIAGNOSIS — N87.1 DYSPLASIA OF CERVIX, HIGH GRADE CIN 2: ICD-10-CM

## 2023-06-26 DIAGNOSIS — Z98.890 POSTOPERATIVE STATE: Primary | ICD-10-CM

## 2023-06-26 PROBLEM — R87.613 HSIL (HIGH GRADE SQUAMOUS INTRAEPITHELIAL LESION) ON PAP SMEAR OF CERVIX: Status: ACTIVE | Noted: 2023-06-26

## 2023-06-26 LAB — HCG, PREGNANCY URINE (POC): NEGATIVE

## 2023-06-26 PROCEDURE — 7100000010 HC PHASE II RECOVERY - FIRST 15 MIN: Performed by: STUDENT IN AN ORGANIZED HEALTH CARE EDUCATION/TRAINING PROGRAM

## 2023-06-26 PROCEDURE — 3600000013 HC SURGERY LEVEL 3 ADDTL 15MIN: Performed by: STUDENT IN AN ORGANIZED HEALTH CARE EDUCATION/TRAINING PROGRAM

## 2023-06-26 PROCEDURE — 2500000003 HC RX 250 WO HCPCS: Performed by: STUDENT IN AN ORGANIZED HEALTH CARE EDUCATION/TRAINING PROGRAM

## 2023-06-26 PROCEDURE — 3600000003 HC SURGERY LEVEL 3 BASE: Performed by: STUDENT IN AN ORGANIZED HEALTH CARE EDUCATION/TRAINING PROGRAM

## 2023-06-26 PROCEDURE — 2580000003 HC RX 258: Performed by: ANESTHESIOLOGY

## 2023-06-26 PROCEDURE — 2580000003 HC RX 258: Performed by: STUDENT IN AN ORGANIZED HEALTH CARE EDUCATION/TRAINING PROGRAM

## 2023-06-26 PROCEDURE — 81025 URINE PREGNANCY TEST: CPT

## 2023-06-26 PROCEDURE — 3700000000 HC ANESTHESIA ATTENDED CARE: Performed by: STUDENT IN AN ORGANIZED HEALTH CARE EDUCATION/TRAINING PROGRAM

## 2023-06-26 PROCEDURE — 3700000001 HC ADD 15 MINUTES (ANESTHESIA): Performed by: STUDENT IN AN ORGANIZED HEALTH CARE EDUCATION/TRAINING PROGRAM

## 2023-06-26 PROCEDURE — 57522 CONIZATION OF CERVIX: CPT | Performed by: STUDENT IN AN ORGANIZED HEALTH CARE EDUCATION/TRAINING PROGRAM

## 2023-06-26 PROCEDURE — 2500000003 HC RX 250 WO HCPCS

## 2023-06-26 PROCEDURE — 6370000000 HC RX 637 (ALT 250 FOR IP): Performed by: STUDENT IN AN ORGANIZED HEALTH CARE EDUCATION/TRAINING PROGRAM

## 2023-06-26 PROCEDURE — 6360000002 HC RX W HCPCS

## 2023-06-26 PROCEDURE — 7100000000 HC PACU RECOVERY - FIRST 15 MIN: Performed by: STUDENT IN AN ORGANIZED HEALTH CARE EDUCATION/TRAINING PROGRAM

## 2023-06-26 PROCEDURE — 88307 TISSUE EXAM BY PATHOLOGIST: CPT

## 2023-06-26 PROCEDURE — 2709999900 HC NON-CHARGEABLE SUPPLY: Performed by: STUDENT IN AN ORGANIZED HEALTH CARE EDUCATION/TRAINING PROGRAM

## 2023-06-26 PROCEDURE — 7100000011 HC PHASE II RECOVERY - ADDTL 15 MIN: Performed by: STUDENT IN AN ORGANIZED HEALTH CARE EDUCATION/TRAINING PROGRAM

## 2023-06-26 PROCEDURE — 88305 TISSUE EXAM BY PATHOLOGIST: CPT

## 2023-06-26 PROCEDURE — 7100000001 HC PACU RECOVERY - ADDTL 15 MIN: Performed by: STUDENT IN AN ORGANIZED HEALTH CARE EDUCATION/TRAINING PROGRAM

## 2023-06-26 RX ORDER — MEPERIDINE HYDROCHLORIDE 50 MG/ML
12.5 INJECTION INTRAMUSCULAR; INTRAVENOUS; SUBCUTANEOUS EVERY 5 MIN PRN
Status: DISCONTINUED | OUTPATIENT
Start: 2023-06-26 | End: 2023-06-26 | Stop reason: HOSPADM

## 2023-06-26 RX ORDER — SODIUM CHLORIDE 0.9 % (FLUSH) 0.9 %
5-40 SYRINGE (ML) INJECTION PRN
Status: DISCONTINUED | OUTPATIENT
Start: 2023-06-26 | End: 2023-06-26 | Stop reason: HOSPADM

## 2023-06-26 RX ORDER — DIPHENHYDRAMINE HYDROCHLORIDE 50 MG/ML
12.5 INJECTION INTRAMUSCULAR; INTRAVENOUS
Status: DISCONTINUED | OUTPATIENT
Start: 2023-06-26 | End: 2023-06-26 | Stop reason: HOSPADM

## 2023-06-26 RX ORDER — IBUPROFEN 600 MG/1
600 TABLET ORAL EVERY 6 HOURS PRN
Qty: 30 TABLET | Refills: 1 | Status: SHIPPED | OUTPATIENT
Start: 2023-06-26

## 2023-06-26 RX ORDER — ONDANSETRON 2 MG/ML
INJECTION INTRAMUSCULAR; INTRAVENOUS PRN
Status: DISCONTINUED | OUTPATIENT
Start: 2023-06-26 | End: 2023-06-26 | Stop reason: SDUPTHER

## 2023-06-26 RX ORDER — PROPOFOL 10 MG/ML
INJECTION, EMULSION INTRAVENOUS PRN
Status: DISCONTINUED | OUTPATIENT
Start: 2023-06-26 | End: 2023-06-26 | Stop reason: SDUPTHER

## 2023-06-26 RX ORDER — HYDRALAZINE HYDROCHLORIDE 20 MG/ML
10 INJECTION INTRAMUSCULAR; INTRAVENOUS
Status: DISCONTINUED | OUTPATIENT
Start: 2023-06-26 | End: 2023-06-26 | Stop reason: HOSPADM

## 2023-06-26 RX ORDER — IODINE SOLUTION STRONG 5% (LUGOL'S) 5 %
SOLUTION ORAL PRN
Status: DISCONTINUED | OUTPATIENT
Start: 2023-06-26 | End: 2023-06-26 | Stop reason: HOSPADM

## 2023-06-26 RX ORDER — MIDAZOLAM HYDROCHLORIDE 1 MG/ML
INJECTION INTRAMUSCULAR; INTRAVENOUS PRN
Status: DISCONTINUED | OUTPATIENT
Start: 2023-06-26 | End: 2023-06-26 | Stop reason: SDUPTHER

## 2023-06-26 RX ORDER — DROPERIDOL 2.5 MG/ML
0.62 INJECTION, SOLUTION INTRAMUSCULAR; INTRAVENOUS
Status: DISCONTINUED | OUTPATIENT
Start: 2023-06-26 | End: 2023-06-26 | Stop reason: HOSPADM

## 2023-06-26 RX ORDER — SENNA AND DOCUSATE SODIUM 50; 8.6 MG/1; MG/1
1 TABLET, FILM COATED ORAL DAILY
Qty: 30 TABLET | Refills: 0 | Status: SHIPPED | OUTPATIENT
Start: 2023-06-26

## 2023-06-26 RX ORDER — METOCLOPRAMIDE HYDROCHLORIDE 5 MG/ML
10 INJECTION INTRAMUSCULAR; INTRAVENOUS
Status: DISCONTINUED | OUTPATIENT
Start: 2023-06-26 | End: 2023-06-26 | Stop reason: HOSPADM

## 2023-06-26 RX ORDER — ACETAMINOPHEN 500 MG
1000 TABLET ORAL EVERY 6 HOURS PRN
Qty: 60 TABLET | Refills: 1 | Status: SHIPPED | OUTPATIENT
Start: 2023-06-26

## 2023-06-26 RX ORDER — SODIUM CHLORIDE, SODIUM LACTATE, POTASSIUM CHLORIDE, CALCIUM CHLORIDE 600; 310; 30; 20 MG/100ML; MG/100ML; MG/100ML; MG/100ML
INJECTION, SOLUTION INTRAVENOUS CONTINUOUS
Status: DISCONTINUED | OUTPATIENT
Start: 2023-06-26 | End: 2023-06-26 | Stop reason: HOSPADM

## 2023-06-26 RX ORDER — BUPIVACAINE HYDROCHLORIDE AND EPINEPHRINE 5; 5 MG/ML; UG/ML
INJECTION, SOLUTION EPIDURAL; INTRACAUDAL; PERINEURAL PRN
Status: DISCONTINUED | OUTPATIENT
Start: 2023-06-26 | End: 2023-06-26 | Stop reason: HOSPADM

## 2023-06-26 RX ORDER — LIDOCAINE HYDROCHLORIDE 10 MG/ML
INJECTION, SOLUTION EPIDURAL; INFILTRATION; INTRACAUDAL; PERINEURAL PRN
Status: DISCONTINUED | OUTPATIENT
Start: 2023-06-26 | End: 2023-06-26 | Stop reason: SDUPTHER

## 2023-06-26 RX ORDER — CARBOXYMETHYLCELLULOSE SODIUM 5 MG/ML
SOLUTION/ DROPS OPHTHALMIC
COMMUNITY
Start: 2023-04-28

## 2023-06-26 RX ORDER — PHENYLEPHRINE HCL IN 0.9% NACL 1 MG/10 ML
SYRINGE (ML) INTRAVENOUS PRN
Status: DISCONTINUED | OUTPATIENT
Start: 2023-06-26 | End: 2023-06-26 | Stop reason: SDUPTHER

## 2023-06-26 RX ORDER — OXYCODONE HYDROCHLORIDE 5 MG/1
5 TABLET ORAL EVERY 6 HOURS PRN
Qty: 3 TABLET | Refills: 0 | Status: SHIPPED | OUTPATIENT
Start: 2023-06-26 | End: 2023-06-29

## 2023-06-26 RX ORDER — SODIUM CHLORIDE 0.9 % (FLUSH) 0.9 %
5-40 SYRINGE (ML) INJECTION EVERY 12 HOURS SCHEDULED
Status: DISCONTINUED | OUTPATIENT
Start: 2023-06-26 | End: 2023-06-26 | Stop reason: HOSPADM

## 2023-06-26 RX ORDER — SODIUM CHLORIDE 9 MG/ML
INJECTION, SOLUTION INTRAVENOUS PRN
Status: DISCONTINUED | OUTPATIENT
Start: 2023-06-26 | End: 2023-06-26 | Stop reason: HOSPADM

## 2023-06-26 RX ORDER — FENTANYL CITRATE 50 UG/ML
INJECTION, SOLUTION INTRAMUSCULAR; INTRAVENOUS PRN
Status: DISCONTINUED | OUTPATIENT
Start: 2023-06-26 | End: 2023-06-26 | Stop reason: SDUPTHER

## 2023-06-26 RX ORDER — ONDANSETRON 4 MG/1
4 TABLET, FILM COATED ORAL EVERY 8 HOURS PRN
Qty: 30 TABLET | Refills: 0 | Status: SHIPPED | OUTPATIENT
Start: 2023-06-26

## 2023-06-26 RX ORDER — DEXAMETHASONE SODIUM PHOSPHATE 10 MG/ML
INJECTION INTRAMUSCULAR; INTRAVENOUS PRN
Status: DISCONTINUED | OUTPATIENT
Start: 2023-06-26 | End: 2023-06-26 | Stop reason: SDUPTHER

## 2023-06-26 RX ORDER — MAGNESIUM HYDROXIDE 1200 MG/15ML
LIQUID ORAL CONTINUOUS PRN
Status: DISCONTINUED | OUTPATIENT
Start: 2023-06-26 | End: 2023-06-26 | Stop reason: HOSPADM

## 2023-06-26 RX ORDER — KETOROLAC TROMETHAMINE 30 MG/ML
INJECTION, SOLUTION INTRAMUSCULAR; INTRAVENOUS PRN
Status: DISCONTINUED | OUTPATIENT
Start: 2023-06-26 | End: 2023-06-26 | Stop reason: SDUPTHER

## 2023-06-26 RX ORDER — ACETIC ACID 5 %
LIQUID (ML) MISCELLANEOUS PRN
Status: DISCONTINUED | OUTPATIENT
Start: 2023-06-26 | End: 2023-06-26 | Stop reason: HOSPADM

## 2023-06-26 RX ADMIN — Medication 100 MCG: at 14:56

## 2023-06-26 RX ADMIN — MIDAZOLAM 2 MG: 1 INJECTION INTRAMUSCULAR; INTRAVENOUS at 14:00

## 2023-06-26 RX ADMIN — LIDOCAINE HYDROCHLORIDE 50 MG: 10 INJECTION, SOLUTION EPIDURAL; INFILTRATION; INTRACAUDAL; PERINEURAL at 14:06

## 2023-06-26 RX ADMIN — KETOROLAC TROMETHAMINE 30 MG: 30 INJECTION, SOLUTION INTRAMUSCULAR at 14:56

## 2023-06-26 RX ADMIN — PROPOFOL 200 MG: 10 INJECTION, EMULSION INTRAVENOUS at 14:07

## 2023-06-26 RX ADMIN — DEXAMETHASONE SODIUM PHOSPHATE 4 MG: 10 INJECTION INTRAMUSCULAR; INTRAVENOUS at 14:13

## 2023-06-26 RX ADMIN — FENTANYL CITRATE 50 MCG: 50 INJECTION, SOLUTION INTRAMUSCULAR; INTRAVENOUS at 15:10

## 2023-06-26 RX ADMIN — FENTANYL CITRATE 50 MCG: 50 INJECTION, SOLUTION INTRAMUSCULAR; INTRAVENOUS at 14:21

## 2023-06-26 RX ADMIN — SODIUM CHLORIDE, POTASSIUM CHLORIDE, SODIUM LACTATE AND CALCIUM CHLORIDE: 600; 310; 30; 20 INJECTION, SOLUTION INTRAVENOUS at 14:40

## 2023-06-26 RX ADMIN — Medication 100 MCG: at 14:31

## 2023-06-26 RX ADMIN — FENTANYL CITRATE 100 MCG: 50 INJECTION, SOLUTION INTRAMUSCULAR; INTRAVENOUS at 14:06

## 2023-06-26 RX ADMIN — ONDANSETRON 4 MG: 2 INJECTION INTRAMUSCULAR; INTRAVENOUS at 14:56

## 2023-06-26 RX ADMIN — Medication 100 MCG: at 14:41

## 2023-06-26 RX ADMIN — SODIUM CHLORIDE, POTASSIUM CHLORIDE, SODIUM LACTATE AND CALCIUM CHLORIDE: 600; 310; 30; 20 INJECTION, SOLUTION INTRAVENOUS at 12:28

## 2023-06-26 RX ADMIN — PROPOFOL 100 MG: 10 INJECTION, EMULSION INTRAVENOUS at 14:08

## 2023-06-26 RX ADMIN — Medication 100 MCG: at 14:37

## 2023-06-26 ASSESSMENT — PAIN - FUNCTIONAL ASSESSMENT: PAIN_FUNCTIONAL_ASSESSMENT: 0-10

## 2023-06-28 LAB — SURGICAL PATHOLOGY REPORT: NORMAL

## 2023-07-03 ENCOUNTER — TELEPHONE (OUTPATIENT)
Dept: OBGYN CLINIC | Age: 43
End: 2023-07-03

## 2023-07-17 ENCOUNTER — NURSE ONLY (OUTPATIENT)
Dept: OBGYN CLINIC | Age: 43
End: 2023-07-17
Payer: MEDICAID

## 2023-07-17 VITALS — WEIGHT: 245.1 LBS | BODY MASS INDEX: 39.39 KG/M2 | HEIGHT: 66 IN

## 2023-07-17 DIAGNOSIS — Z30.42 SURVEILLANCE OF CONTRACEPTIVE INJECTION: Primary | ICD-10-CM

## 2023-07-17 PROCEDURE — 96372 THER/PROPH/DIAG INJ SC/IM: CPT | Performed by: ADVANCED PRACTICE MIDWIFE

## 2023-07-17 RX ORDER — MEDROXYPROGESTERONE ACETATE 150 MG/ML
150 INJECTION, SUSPENSION INTRAMUSCULAR ONCE
Status: COMPLETED | OUTPATIENT
Start: 2023-07-17 | End: 2023-07-17

## 2023-07-17 RX ADMIN — MEDROXYPROGESTERONE ACETATE 150 MG: 150 INJECTION, SUSPENSION INTRAMUSCULAR at 13:34

## 2023-07-17 NOTE — PROGRESS NOTES
Date last pap: 4/4/23  Last Depo-Provera: 4/24/23  Side Effects if any: none  Serum HCG indicated: n/a  Depo-Provera 150 mg IM given by: LARISSA Sahu LPN in Left deltoid per patient request w/out difficulty. Next appointment due is a yearly BC renew since patient is going to [place] office for abnormal pap.

## 2023-07-27 ENCOUNTER — TELEMEDICINE (OUTPATIENT)
Dept: OBGYN CLINIC | Age: 43
End: 2023-07-27

## 2023-07-27 DIAGNOSIS — Z98.890 S/P LEEP: ICD-10-CM

## 2023-07-27 DIAGNOSIS — Z09 POSTOPERATIVE EXAMINATION: Primary | ICD-10-CM

## 2023-07-27 DIAGNOSIS — D06.9 HIGH GRADE SQUAMOUS INTRAEPITHELIAL LESION (HGSIL), GRADE 3 CIN, ON BIOPSY OF CERVIX: ICD-10-CM

## 2023-07-27 NOTE — PROGRESS NOTES
billable service, which includes applicable co-pays. This Virtual Visit was conducted with patient's (and/or legal guardian's) consent. Patient identification was verified, and a caregiver was present when appropriate. The patient was located at Home: [address] [place] [address]  Provider was located at [facility] (Appt Dept): [address] [place] [address],[address]       Total time spent for this encounter:  10 minutes     --Koko Song DO on 7/27/2023 at 1:11 PM    An electronic signature was used to authenticate this note.

## 2023-09-27 RX ORDER — MEDROXYPROGESTERONE ACETATE 150 MG/ML
150 INJECTION, SUSPENSION INTRAMUSCULAR
Qty: 1 ML | Refills: 2
Start: 2023-09-27

## 2023-10-02 ENCOUNTER — NURSE ONLY (OUTPATIENT)
Dept: OBGYN CLINIC | Age: 43
End: 2023-10-02
Payer: MEDICAID

## 2023-10-02 VITALS
HEIGHT: 66 IN | SYSTOLIC BLOOD PRESSURE: 116 MMHG | DIASTOLIC BLOOD PRESSURE: 80 MMHG | BODY MASS INDEX: 38.73 KG/M2 | WEIGHT: 241 LBS

## 2023-10-02 DIAGNOSIS — Z30.42 SURVEILLANCE OF CONTRACEPTIVE INJECTION: ICD-10-CM

## 2023-10-02 DIAGNOSIS — N94.6 DYSMENORRHEA: Primary | ICD-10-CM

## 2023-10-02 PROCEDURE — 96372 THER/PROPH/DIAG INJ SC/IM: CPT | Performed by: ADVANCED PRACTICE MIDWIFE

## 2023-10-02 RX ORDER — PHENTERMINE HYDROCHLORIDE 37.5 MG/1
37.5 CAPSULE ORAL EVERY MORNING
COMMUNITY
Start: 2023-09-15

## 2023-10-02 RX ORDER — PREGABALIN 25 MG/1
CAPSULE ORAL
COMMUNITY
Start: 2023-09-15

## 2023-10-02 RX ORDER — FLUOXETINE HYDROCHLORIDE 20 MG/1
CAPSULE ORAL
COMMUNITY
Start: 2023-09-06

## 2023-10-02 RX ORDER — MEDROXYPROGESTERONE ACETATE 150 MG/ML
150 INJECTION, SUSPENSION INTRAMUSCULAR ONCE
Status: COMPLETED | OUTPATIENT
Start: 2023-10-02 | End: 2023-10-02

## 2023-10-02 RX ADMIN — MEDROXYPROGESTERONE ACETATE 150 MG: 150 INJECTION, SUSPENSION INTRAMUSCULAR at 13:36

## 2023-10-02 NOTE — PROGRESS NOTES
Date of last pap: 4/4/23  Last Depo-Provera injection: 7/17/23  Side Effects if any: none  Weight loss or gain: 4lb loss  Serum HCG indicated: n/a  Depo-Provera 150 mg IM given by: LARISSA Sahu LPN in [place]. Glut  Next appointment due in 12wks for next depo injection.

## 2023-12-13 ENCOUNTER — TELEPHONE (OUTPATIENT)
Dept: OBGYN CLINIC | Age: 43
End: 2023-12-13

## 2023-12-19 DIAGNOSIS — Z30.42 DEPOT CONTRACEPTION: ICD-10-CM

## 2023-12-19 RX ORDER — MEDROXYPROGESTERONE ACETATE 150 MG/ML
INJECTION, SUSPENSION INTRAMUSCULAR
Qty: 1 ML | Refills: 0 | OUTPATIENT
Start: 2023-12-19

## 2024-01-23 SDOH — ECONOMIC STABILITY: HOUSING INSECURITY
IN THE LAST 12 MONTHS, WAS THERE A TIME WHEN YOU DID NOT HAVE A STEADY PLACE TO SLEEP OR SLEPT IN A SHELTER (INCLUDING NOW)?: NO

## 2024-01-23 SDOH — ECONOMIC STABILITY: FOOD INSECURITY: WITHIN THE PAST 12 MONTHS, YOU WORRIED THAT YOUR FOOD WOULD RUN OUT BEFORE YOU GOT MONEY TO BUY MORE.: NEVER TRUE

## 2024-01-23 SDOH — ECONOMIC STABILITY: FOOD INSECURITY: WITHIN THE PAST 12 MONTHS, THE FOOD YOU BOUGHT JUST DIDN'T LAST AND YOU DIDN'T HAVE MONEY TO GET MORE.: NEVER TRUE

## 2024-01-23 SDOH — ECONOMIC STABILITY: INCOME INSECURITY: HOW HARD IS IT FOR YOU TO PAY FOR THE VERY BASICS LIKE FOOD, HOUSING, MEDICAL CARE, AND HEATING?: NOT HARD AT ALL

## 2024-01-23 ASSESSMENT — PATIENT HEALTH QUESTIONNAIRE - PHQ9
SUM OF ALL RESPONSES TO PHQ QUESTIONS 1-9: 0
SUM OF ALL RESPONSES TO PHQ9 QUESTIONS 1 & 2: 0
1. LITTLE INTEREST OR PLEASURE IN DOING THINGS: 0
1. LITTLE INTEREST OR PLEASURE IN DOING THINGS: NOT AT ALL
SUM OF ALL RESPONSES TO PHQ QUESTIONS 1-9: 0
2. FEELING DOWN, DEPRESSED OR HOPELESS: 0
SUM OF ALL RESPONSES TO PHQ QUESTIONS 1-9: 0
SUM OF ALL RESPONSES TO PHQ9 QUESTIONS 1 & 2: 0
SUM OF ALL RESPONSES TO PHQ QUESTIONS 1-9: 0
2. FEELING DOWN, DEPRESSED OR HOPELESS: NOT AT ALL

## 2024-01-24 ENCOUNTER — HOSPITAL ENCOUNTER (OUTPATIENT)
Age: 44
Setting detail: SPECIMEN
Discharge: HOME | End: 2024-01-24

## 2024-01-24 ENCOUNTER — OFFICE VISIT (OUTPATIENT)
Dept: OBGYN CLINIC | Age: 44
End: 2024-01-24
Payer: MEDICAID

## 2024-01-24 VITALS
BODY MASS INDEX: 40.34 KG/M2 | DIASTOLIC BLOOD PRESSURE: 83 MMHG | HEIGHT: 66 IN | WEIGHT: 251 LBS | SYSTOLIC BLOOD PRESSURE: 119 MMHG | HEART RATE: 98 BPM

## 2024-01-24 DIAGNOSIS — B96.89 BV (BACTERIAL VAGINOSIS): ICD-10-CM

## 2024-01-24 DIAGNOSIS — N89.8 VAGINAL DISCHARGE: ICD-10-CM

## 2024-01-24 DIAGNOSIS — D06.9 HIGH GRADE SQUAMOUS INTRAEPITHELIAL LESION (HGSIL), GRADE 3 CIN, ON BIOPSY OF CERVIX: ICD-10-CM

## 2024-01-24 DIAGNOSIS — N76.0 BV (BACTERIAL VAGINOSIS): ICD-10-CM

## 2024-01-24 DIAGNOSIS — Z98.890 S/P LEEP: Primary | ICD-10-CM

## 2024-01-24 DIAGNOSIS — B37.9 YEAST INFECTION: ICD-10-CM

## 2024-01-24 LAB
CANDIDA SPECIES: POSITIVE
GARDNERELLA VAGINALIS: POSITIVE
SOURCE: ABNORMAL
TRICHOMONAS: NEGATIVE

## 2024-01-24 PROCEDURE — 1036F TOBACCO NON-USER: CPT | Performed by: STUDENT IN AN ORGANIZED HEALTH CARE EDUCATION/TRAINING PROGRAM

## 2024-01-24 PROCEDURE — G8417 CALC BMI ABV UP PARAM F/U: HCPCS | Performed by: STUDENT IN AN ORGANIZED HEALTH CARE EDUCATION/TRAINING PROGRAM

## 2024-01-24 PROCEDURE — G8484 FLU IMMUNIZE NO ADMIN: HCPCS | Performed by: STUDENT IN AN ORGANIZED HEALTH CARE EDUCATION/TRAINING PROGRAM

## 2024-01-24 PROCEDURE — G8427 DOCREV CUR MEDS BY ELIG CLIN: HCPCS | Performed by: STUDENT IN AN ORGANIZED HEALTH CARE EDUCATION/TRAINING PROGRAM

## 2024-01-24 NOTE — PROGRESS NOTES
Dysplasia of cervix, high grade NEIDA 2 06/26/2023    HSIL (high grade squamous intraepithelial lesion) on Pap smear of cervix 06/26/2023    NEIDA III (cervical intraepithelial neoplasia grade III) with severe dysplasia 06/26/2023    S/p LEEP 6/26/23 ** 06/25/2023    High grade squamous intraepithelial lesion (HGSIL), grade 3 NEIDA, on biopsy of cervix 05/03/2023    Encounter for Nexplanon removal 12/11/2018    Depot contraception 12/11/2018    Muscle contraction headache 04/11/2018    History of concussion 02/13/2018    Cognitive and behavioral changes 02/13/2018    Headaches due to old head injury 02/13/2018    Post concussion syndrome 02/13/2018    Visual changes 02/13/2018    Herniation of intervertebral disc of cervical region 08/18/2017    Insomnia 12/10/2015    Fatigue 12/10/2015    Dysmenorrhea 12/10/2015    Contusion of scalp, face, and neck, excluding eyes 01/21/2015     Work-related injury 12/16/2014 when she struck on the head by a metal bar.  Dizzy but no loss of consciousness.  CT brain noncontrast 12/2014 normal.  Seen in emergency department and released but followed in occupational health at [facility]. Reporting headaches, dizziness and photophobia attributed to post concussion syndrome.  Replacing Deprecated Diagnoses      Sleep disturbance 10/28/2013    Abnormal weight gain 04/19/2012    Insulin resistance 11/19/2011    Other anxiety states 11/19/2011       Counseling:  Repeat Annual every 1 year  Cervical Cytology Evaluation begins at 21 years old.  If Negative Cytology, Follow-up screening per current guidelines.   Counseled on preventative health maintenance follow-up.  Orders Placed This Encounter   Procedures    Vaginitis DNA Probe     Standing Status:   Future     Standing Expiration Date:   1/24/2025    PAP SMEAR     Standing Status:   Future     Standing Expiration Date:   1/24/2025     Order Specific Question:   Collection Type     Answer:   Thin Prep     Order Specific

## 2024-01-26 RX ORDER — FLUCONAZOLE 150 MG/1
150 TABLET ORAL ONCE
Qty: 1 TABLET | Refills: 0 | Status: SHIPPED | OUTPATIENT
Start: 2024-01-26 | End: 2024-01-26

## 2024-01-26 RX ORDER — METRONIDAZOLE 500 MG/1
500 TABLET ORAL 2 TIMES DAILY
Qty: 14 TABLET | Refills: 0 | Status: SHIPPED | OUTPATIENT
Start: 2024-01-26 | End: 2024-02-02

## 2024-01-28 LAB
HPV I/H RISK 4 DNA CVX QL NAA+PROBE: NOT DETECTED
HPV SAMPLE: NORMAL
HPV, INTERPRETATION: NORMAL
HPV16 DNA CVX QL NAA+PROBE: NOT DETECTED
HPV18 DNA CVX QL NAA+PROBE: NOT DETECTED
SPECIMEN DESCRIPTION: NORMAL

## 2024-02-16 LAB — CYTOLOGY REPORT: NORMAL

## 2024-03-18 ENCOUNTER — NURSE ONLY (OUTPATIENT)
Dept: OBGYN CLINIC | Age: 44
End: 2024-03-18
Payer: MEDICAID

## 2024-03-18 VITALS — SYSTOLIC BLOOD PRESSURE: 122 MMHG | WEIGHT: 240 LBS | BODY MASS INDEX: 38.74 KG/M2 | DIASTOLIC BLOOD PRESSURE: 80 MMHG

## 2024-03-18 DIAGNOSIS — Z30.42 SURVEILLANCE OF CONTRACEPTIVE INJECTION: Primary | ICD-10-CM

## 2024-03-18 PROCEDURE — 96372 THER/PROPH/DIAG INJ SC/IM: CPT | Performed by: ADVANCED PRACTICE MIDWIFE

## 2024-03-18 RX ORDER — MEDROXYPROGESTERONE ACETATE 150 MG/ML
150 INJECTION, SUSPENSION INTRAMUSCULAR ONCE
Status: COMPLETED | OUTPATIENT
Start: 2024-03-18 | End: 2024-03-18

## 2024-03-18 RX ADMIN — MEDROXYPROGESTERONE ACETATE 150 MG: 150 INJECTION, SUSPENSION INTRAMUSCULAR at 15:19

## 2024-03-18 NOTE — PROGRESS NOTES
[facility] OBSTETRICS AND GYNECOLOGY  [address]  [facility] [address]  Dept: [phone]   Patient Name: Fanta Petersen  Patient : 1980  MRN #: [id number]  CSN #: [id number]    SV CNM Depo-provera Nurse Visit    Visit date: 3/18/2024     There were no vitals taken for this visit.       well tolerated      No LMP recorded. Patient has had an injection.      Last annual visit: 23     Last depo-provera injection: 23    Is the patient experiencing any side effects: No       After obtaining consent, and per orders of  Jessica Calix CNM injection of depo given in Right upper quad. gluteus by Haley Perry MA. Patient instructed to remain in clinic for 20 minutes afterwards, and to report any adverse reaction to me immediately     Lot # [id number]   Exp date: 10/31/2025    Patient was notified:    Next Depo-provera due: 12 weeks      Next annual due: had LEEP 2023 and repeat pap 24 with Dr. Rollins     Completed chart forwarded to Jessica Calix after completing visit.     Charted by Haley Perry MA

## 2024-03-29 ENCOUNTER — TELEPHONE (OUTPATIENT)
Dept: PAIN MANAGEMENT | Age: 44
End: 2024-03-29

## 2024-05-17 ENCOUNTER — INITIAL CONSULT (OUTPATIENT)
Dept: PAIN MANAGEMENT | Age: 44
End: 2024-05-17
Payer: MEDICAID

## 2024-05-17 VITALS — BODY MASS INDEX: 37.28 KG/M2 | HEIGHT: 66 IN | WEIGHT: 232 LBS

## 2024-05-17 DIAGNOSIS — M54.12 CERVICAL RADICULOPATHY: Primary | ICD-10-CM

## 2024-05-17 DIAGNOSIS — M47.817 LUMBOSACRAL SPONDYLOSIS WITHOUT MYELOPATHY: ICD-10-CM

## 2024-05-17 DIAGNOSIS — M47.812 CERVICAL SPONDYLOSIS: ICD-10-CM

## 2024-05-17 PROCEDURE — G8417 CALC BMI ABV UP PARAM F/U: HCPCS | Performed by: STUDENT IN AN ORGANIZED HEALTH CARE EDUCATION/TRAINING PROGRAM

## 2024-05-17 PROCEDURE — G8427 DOCREV CUR MEDS BY ELIG CLIN: HCPCS | Performed by: STUDENT IN AN ORGANIZED HEALTH CARE EDUCATION/TRAINING PROGRAM

## 2024-05-17 PROCEDURE — 99204 OFFICE O/P NEW MOD 45 MIN: CPT | Performed by: STUDENT IN AN ORGANIZED HEALTH CARE EDUCATION/TRAINING PROGRAM

## 2024-05-17 PROCEDURE — 1036F TOBACCO NON-USER: CPT | Performed by: STUDENT IN AN ORGANIZED HEALTH CARE EDUCATION/TRAINING PROGRAM

## 2024-05-17 NOTE — PROGRESS NOTES
- Amirah - last visit - \"it's been awhile.\"    Under care of team     OB/GYN - Dr. Rosenthal - last visit - 5/25/2023    Under care of team     RHEUMATOLOGY - Dr. Hayward - last visit 5/24/2023    Under care of team     PSYCHOLOGY - Dr. Polk - last visit 6/2023 - sees \"every couple of weeks\"       Past Surgical History:   Procedure Laterality Date    KIDNEY SURGERY  01/01/2004    stent for kidney stone    LEEP  10/01/2012    LEEP N/A 6/26/2023    LEEP performed by Leigh Rosenthal DO at [facility] OR    WISDOM TOOTH EXTRACTION         Family History   Problem Relation Age of Onset    High Blood Pressure Mother     Alcohol Abuse Father     Cancer Maternal Grandfather         esophageal    Alcohol Abuse Paternal Grandmother     Diabetes Paternal Grandfather        Social History     Socioeconomic History    Marital status: Single     Spouse name: Not on file    Number of children: Not on file    Years of education: Not on file    Highest education level: Not on file   Occupational History    Not on file   Tobacco Use    Smoking status: Never    Smokeless tobacco: Never   Vaping Use    Vaping Use: Every day    Substances: THC (at HS for sleep)   Substance and Sexual Activity    Alcohol use: Yes     Comment: \"Maybe 1 drink twice a month.\"    Drug use: Yes     Types: Marijuana (Weed)     Comment: THC vape every night - edibles prn    Sexual activity: Yes     Partners: Male   Other Topics Concern    Not on file   Social History Narrative    Not on file     Social Determinants of Health     Financial Resource Strain: Low Risk  (7/8/2021)    Overall Financial Resource Strain (CARDIA)     Difficulty of Paying Living Expenses: Not very hard   Food Insecurity: Not on file (1/23/2024)   Transportation Needs: No Transportation Needs (7/8/2021)    PRAPARE - Transportation     Lack of Transportation (Medical): No     Lack of Transportation (Non-Medical): No   Physical Activity: Not on file   Stress: Not on file   Social Connections:

## 2024-06-03 ENCOUNTER — OFFICE VISIT (OUTPATIENT)
Dept: OBGYN CLINIC | Age: 44
End: 2024-06-03
Payer: MEDICAID

## 2024-06-03 VITALS
SYSTOLIC BLOOD PRESSURE: 132 MMHG | BODY MASS INDEX: 38.09 KG/M2 | DIASTOLIC BLOOD PRESSURE: 78 MMHG | WEIGHT: 237 LBS | HEIGHT: 66 IN

## 2024-06-03 DIAGNOSIS — D06.9 HIGH GRADE SQUAMOUS INTRAEPITHELIAL LESION (HGSIL), GRADE 3 CIN, ON BIOPSY OF CERVIX: ICD-10-CM

## 2024-06-03 DIAGNOSIS — Z12.31 BREAST CANCER SCREENING BY MAMMOGRAM: ICD-10-CM

## 2024-06-03 DIAGNOSIS — N94.6 DYSMENORRHEA: ICD-10-CM

## 2024-06-03 DIAGNOSIS — Z00.00 ENCOUNTER FOR WELL WOMAN EXAM WITHOUT GYNECOLOGICAL EXAM: ICD-10-CM

## 2024-06-03 DIAGNOSIS — Z30.42 SURVEILLANCE OF CONTRACEPTIVE INJECTION: Primary | ICD-10-CM

## 2024-06-03 PROCEDURE — 99396 PREV VISIT EST AGE 40-64: CPT

## 2024-06-03 PROCEDURE — 96372 THER/PROPH/DIAG INJ SC/IM: CPT

## 2024-06-03 RX ORDER — MEDROXYPROGESTERONE ACETATE 150 MG/ML
150 INJECTION, SUSPENSION INTRAMUSCULAR ONCE
Status: COMPLETED | OUTPATIENT
Start: 2024-06-03 | End: 2024-06-03

## 2024-06-03 RX ADMIN — MEDROXYPROGESTERONE ACETATE 150 MG: 150 INJECTION, SUSPENSION INTRAMUSCULAR at 14:12

## 2024-06-03 ASSESSMENT — ANXIETY QUESTIONNAIRES
7. FEELING AFRAID AS IF SOMETHING AWFUL MIGHT HAPPEN: NOT AT ALL
GAD7 TOTAL SCORE: 0
4. TROUBLE RELAXING: NOT AT ALL
3. WORRYING TOO MUCH ABOUT DIFFERENT THINGS: NOT AT ALL
2. NOT BEING ABLE TO STOP OR CONTROL WORRYING: NOT AT ALL
6. BECOMING EASILY ANNOYED OR IRRITABLE: NOT AT ALL
5. BEING SO RESTLESS THAT IT IS HARD TO SIT STILL: NOT AT ALL
1. FEELING NERVOUS, ANXIOUS, OR ON EDGE: NOT AT ALL

## 2024-06-03 ASSESSMENT — PATIENT HEALTH QUESTIONNAIRE - PHQ9
1. LITTLE INTEREST OR PLEASURE IN DOING THINGS: NOT AT ALL
2. FEELING DOWN, DEPRESSED OR HOPELESS: NOT AT ALL
SUM OF ALL RESPONSES TO PHQ QUESTIONS 1-9: 0
SUM OF ALL RESPONSES TO PHQ9 QUESTIONS 1 & 2: 0
SUM OF ALL RESPONSES TO PHQ QUESTIONS 1-9: 0

## 2024-06-03 ASSESSMENT — ENCOUNTER SYMPTOMS
GASTROINTESTINAL NEGATIVE: 1
RESPIRATORY NEGATIVE: 1

## 2024-06-03 NOTE — PROGRESS NOTES
Pt is here for annual exam  Last Mamm n/a  Last Pap 1/24/24 no iel hpv- (6 month pap after LEEP)  After obtaining consent, and per orders of  Yuliya clay CNM injection of depo given in Right upper quad. gluteus by Haley Perry CMA.  LOT [id number]  Exp 9/30/25  CECILIA- 0   PHQ2- 0     
depression      6/3/2024     1:00 PM   CECILIA 7 SCORE   ECCILIA-7 Total Score 0     Interpretation of CECILIA-7 score: 5-9 = mild anxiety, 10-14 = moderate anxiety, 15+ = severe anxiety. Recommend referral to behavioral health for scores 10 or greater.     Social Determinants of Health:   Social History     Tobacco Use   Smoking Status Never   Smokeless Tobacco Never      Social History     Substance and Sexual Activity   Alcohol Use Yes    Comment: \"Maybe 1 drink twice a month.\"      Social History     Substance and Sexual Activity   Drug Use Yes    Types: Marijuana (Weed)    Comment: THC vape every night - edibles prn      Financial Resource Strain: Low Risk  (7/8/2021)    Overall Financial Resource Strain (CARDIA)     Difficulty of Paying Living Expenses: Not very hard      Food Insecurity: Not on file (1/23/2024)      Transportation Needs: No Transportation Needs (7/8/2021)    PRAPARE - Transportation     Lack of Transportation (Medical): No     Lack of Transportation (Non-Medical): No      Intimate Partner Violence: Not At Risk (7/8/2021)    Humiliation, Afraid, Rape, and Kick questionnaire     Fear of Current or Ex-Partner: No     Emotionally Abused: No     Physically Abused: No     Sexually Abused: No     Objective  Blood pressure 132/78, height 1.676 m (5' 6\"), weight 107.5 kg (237 lb), not currently breastfeeding. No LMP recorded. Patient has had an injection. Body mass index is 38.25 kg/m².   Current Outpatient Medications on File Prior to Visit   Medication Sig Dispense Refill    FLUoxetine (PROZAC) 20 MG capsule take 2 capsule by mouth once daily      phentermine 37.5 MG capsule Take 1 capsule by mouth every morning.      pregabalin (LYRICA) 25 MG capsule take 1 capsule by mouth every morning and at bedtime      medroxyPROGESTERone (DEPO-PROVERA) 150 MG/ML injection Inject 1 mL into the muscle every 3 months 1 mL 2    REFRESH TEARS 0.5 % SOLN ophthalmic solution instill 1 drop into both eyes four times a day

## 2024-06-07 ENCOUNTER — HOSPITAL ENCOUNTER (OUTPATIENT)
Dept: MRI IMAGING | Age: 44
End: 2024-06-07
Attending: STUDENT IN AN ORGANIZED HEALTH CARE EDUCATION/TRAINING PROGRAM
Payer: MEDICAID

## 2024-06-07 DIAGNOSIS — M47.817 LUMBOSACRAL SPONDYLOSIS WITHOUT MYELOPATHY: ICD-10-CM

## 2024-06-07 PROCEDURE — 72148 MRI LUMBAR SPINE W/O DYE: CPT

## 2024-06-13 ENCOUNTER — OFFICE VISIT (OUTPATIENT)
Dept: NEUROSURGERY | Age: 44
End: 2024-06-13
Payer: MEDICAID

## 2024-06-13 VITALS
OXYGEN SATURATION: 98 % | WEIGHT: 239 LBS | SYSTOLIC BLOOD PRESSURE: 131 MMHG | TEMPERATURE: 98 F | HEART RATE: 102 BPM | BODY MASS INDEX: 38.58 KG/M2 | RESPIRATION RATE: 18 BRPM | DIASTOLIC BLOOD PRESSURE: 82 MMHG

## 2024-06-13 DIAGNOSIS — M47.812 CERVICAL SPONDYLOSIS: ICD-10-CM

## 2024-06-13 DIAGNOSIS — M48.02 CERVICAL STENOSIS OF SPINAL CANAL: Primary | ICD-10-CM

## 2024-06-13 PROCEDURE — G8417 CALC BMI ABV UP PARAM F/U: HCPCS | Performed by: NEUROLOGICAL SURGERY

## 2024-06-13 PROCEDURE — 99204 OFFICE O/P NEW MOD 45 MIN: CPT | Performed by: NEUROLOGICAL SURGERY

## 2024-06-13 PROCEDURE — G8427 DOCREV CUR MEDS BY ELIG CLIN: HCPCS | Performed by: NEUROLOGICAL SURGERY

## 2024-06-13 PROCEDURE — 1036F TOBACCO NON-USER: CPT | Performed by: NEUROLOGICAL SURGERY

## 2024-06-13 NOTE — PROGRESS NOTES
Department of Neurosurgery                                                 New patient clinic note      Reason for Consult:  Cervical radiculopathy and cervical spondylosis  Requesting Physician:  Cristopher Saucedo DO.  Neurosurgeon: Johnny Escobar DO      History Obtained From:  patient    CHIEF COMPLAINT:         Chief Complaint   Patient presents with    New Patient    Neck Pain       HISTORY OF PRESENT ILLNESS:       The patient is a 43 y.o. female who presents for consultation for cervical radiculopathy and cervical spondylosis.    Patient complains of neck pain with radicular symptoms down the left upper extremity. Patient reports that this pain onset in 2014 after she had hit her head at work. She says that afterwards she began to have tightness and pain in her neck. Afterwards she developed radicular symptoms and occasionally feels a \"zinging\" down her left arm to her hand. She states that her symptoms have been progressively worsening since her accident. She states that her neck pain is worse than the radicular symptoms. She denies any acute injuries but does admit to frequent falls due to poor balance. She says that she has had balance issues since 2019.    She was seen by Dr. Hendricks who did not feel that surgical intervention was warranted at this time. Afterwards she had been referred here for a second opinion by Dr. Saucedo. She states that her pain is aggravated by poor weather, lack of sleep, and particularly activity. She also states that she notices her balance issues more with activity. She experiences marked morning stiffness in both her neck and digits of her left hand.     She has done multiple rounds of physical therapy for her neck, mostly in [id number]. She is in physical therapy currently however she says that this is more targeted towards her fibromyalgia. She has not yet received any injections from Dr. Saucedo. She does receive some relief from her neck pain via massages.    PAST

## 2024-07-25 ENCOUNTER — OFFICE VISIT (OUTPATIENT)
Dept: PAIN MANAGEMENT | Age: 44
End: 2024-07-25
Payer: MEDICAID

## 2024-07-25 VITALS — WEIGHT: 239 LBS | HEIGHT: 66 IN | BODY MASS INDEX: 38.41 KG/M2

## 2024-07-25 DIAGNOSIS — M47.812 CERVICAL SPONDYLOSIS: ICD-10-CM

## 2024-07-25 DIAGNOSIS — M54.12 CERVICAL RADICULOPATHY: Primary | ICD-10-CM

## 2024-07-25 DIAGNOSIS — M47.817 LUMBOSACRAL SPONDYLOSIS WITHOUT MYELOPATHY: ICD-10-CM

## 2024-07-25 PROCEDURE — 1036F TOBACCO NON-USER: CPT | Performed by: NURSE PRACTITIONER

## 2024-07-25 PROCEDURE — 99213 OFFICE O/P EST LOW 20 MIN: CPT | Performed by: NURSE PRACTITIONER

## 2024-07-25 PROCEDURE — G8427 DOCREV CUR MEDS BY ELIG CLIN: HCPCS | Performed by: NURSE PRACTITIONER

## 2024-07-25 PROCEDURE — G8417 CALC BMI ABV UP PARAM F/U: HCPCS | Performed by: NURSE PRACTITIONER

## 2024-07-25 RX ORDER — TAMSULOSIN HYDROCHLORIDE 0.4 MG/1
CAPSULE ORAL
COMMUNITY
Start: 2024-07-24

## 2024-07-25 RX ORDER — MELOXICAM 7.5 MG/1
TABLET ORAL
COMMUNITY
Start: 2024-07-24

## 2024-07-25 ASSESSMENT — ENCOUNTER SYMPTOMS
CONSTIPATION: 0
SHORTNESS OF BREATH: 0
BACK PAIN: 1
TROUBLE SWALLOWING: 0
COUGH: 0

## 2024-07-25 NOTE — PROGRESS NOTES
Chief Complaint   Patient presents with    Neck Pain         PMH  Pt complains of neck pain. Cervical MRI with multilevel disc herniations, most notably at C5-C6. Large central disc extrusion indents the ventral thecal sac and ventral surface of the spinal cord with questionable cord signal abnormality. There is resultant moderate spinal canal stenosis.     She has seen Dr. Escobar and Dr. Hendricks with nothing surgical planned. She does have a flexion/extension XR pending - she plans to complete this next week.     Neck Pain   This is a chronic problem. The current episode started more than 1 year ago. The problem occurs constantly. The problem has been unchanged. The pain is associated with nothing. The pain is present in the midline. The quality of the pain is described as aching. The pain is at a severity of 3/10. The pain is mild. The symptoms are aggravated by bending, position and twisting. The pain is Same all the time. Stiffness is present In the morning. Pertinent negatives include no chest pain, fever, pain with swallowing or trouble swallowing. She has tried bed rest, ice, heat, home exercises, muscle relaxants, NSAIDs, acetaminophen and oral narcotics for the symptoms.     Patient denies any new neurological symptoms. No bowel or bladder incontinence, no weakness, and no falling.      Past Medical History:   Diagnosis Date    Anxiety     Cervical stenosis of spine     Chronic fatigue     Concussion 2014    Reshelving items at work - has post-concussive syndrome    Depression     Dizziness     Dog bite 1984    right cheek and lip    Dysmetabolic syndrome X     Fibromyalgia     6/15/2023 - PT services    GERD (gastroesophageal reflux disease)     HSIL (high grade squamous intraepithelial lesion) on Pap smear of cervix 04/07/2023    Insomnia     Insulin resistance 11/19/2011    Kidney stones     Migraine     Other anxiety states 11/19/2011    PTSD (post-traumatic stress disorder)     Since concussion

## 2024-08-11 DIAGNOSIS — Z30.42 DEPOT CONTRACEPTION: ICD-10-CM

## 2024-08-12 NOTE — TELEPHONE ENCOUNTER
aFnta Petersen is requesting a refill on 8/12/24.     Last Annual visit:  6/3/24  Next Office visit:  8/19/24      Last prescribing provider:  Bessie DRISCOLL

## 2024-08-19 ENCOUNTER — NURSE ONLY (OUTPATIENT)
Dept: OBGYN CLINIC | Age: 44
End: 2024-08-19
Payer: MEDICAID

## 2024-08-19 VITALS
DIASTOLIC BLOOD PRESSURE: 84 MMHG | WEIGHT: 234.4 LBS | HEART RATE: 83 BPM | BODY MASS INDEX: 37.67 KG/M2 | HEIGHT: 66 IN | SYSTOLIC BLOOD PRESSURE: 122 MMHG

## 2024-08-19 DIAGNOSIS — Z30.42 SURVEILLANCE OF CONTRACEPTIVE INJECTION: Primary | ICD-10-CM

## 2024-08-19 PROCEDURE — 96372 THER/PROPH/DIAG INJ SC/IM: CPT | Performed by: ADVANCED PRACTICE MIDWIFE

## 2024-08-19 RX ORDER — MEDROXYPROGESTERONE ACETATE 150 MG/ML
150 INJECTION, SUSPENSION INTRAMUSCULAR ONCE
Status: COMPLETED | OUTPATIENT
Start: 2024-08-19 | End: 2024-08-19

## 2024-08-19 RX ADMIN — MEDROXYPROGESTERONE ACETATE 150 MG: 150 INJECTION, SUSPENSION INTRAMUSCULAR at 13:40

## 2024-08-19 NOTE — PROGRESS NOTES
[facility] OBSTETRICS AND GYNECOLOGY  [address]   [address]  [facility] [address]  Dept: [phone]   Patient Name: Fanta Petersen  Patient : 1980  MRN #: [id number]  [facility] #: [id number]     TRISTEN Depo-provera Nurse Visit    Visit date: 2024     /84   Pulse 83   Ht 1.676 m (5' 6\")   Wt 106.3 kg (234 lb 6.4 oz)   BMI 37.83 kg/m²        well tolerated      No LMP recorded. Patient has had an injection.      Last annual visit: 6/3/24     Last depo-provera injection: 6/3/24    Is the patient experiencing any side effects: No         After obtaining consent, and per orders of  Jessica Calix CNM injection of depo provera given in Left upper quad. gluteus by AVE TANG MA. Patient instructed to remain in clinic for 20 minutes afterwards, and to report any adverse reaction to me immediately     Lot # [id number]   Exp date: 10/31/2025    Patient was notified:    Next Depo-provera due: 12 weeks       Next annual due: 25    Completed chart forwarded to AMERICO Johnson  after completing visit.     Charted by AVE TANG MA

## 2024-08-20 RX ORDER — MEDROXYPROGESTERONE ACETATE 150 MG/ML
INJECTION, SUSPENSION INTRAMUSCULAR
Qty: 1 ML | Refills: 0 | OUTPATIENT
Start: 2024-08-20

## 2024-09-19 ENCOUNTER — HOSPITAL ENCOUNTER (OUTPATIENT)
Age: 44
Discharge: HOME | End: 2024-09-21
Payer: MEDICAID

## 2024-09-19 ENCOUNTER — HOSPITAL ENCOUNTER (OUTPATIENT)
Dept: GENERAL RADIOLOGY | Age: 44
Discharge: HOME | End: 2024-09-21
Payer: MEDICAID

## 2024-09-19 DIAGNOSIS — M48.02 CERVICAL STENOSIS OF SPINAL CANAL: ICD-10-CM

## 2024-09-19 PROCEDURE — 72040 X-RAY EXAM NECK SPINE 2-3 VW: CPT

## 2024-09-20 ENCOUNTER — OFFICE VISIT (OUTPATIENT)
Dept: PAIN MANAGEMENT | Age: 44
End: 2024-09-20
Payer: MEDICAID

## 2024-09-20 VITALS — HEIGHT: 66 IN | WEIGHT: 234 LBS | BODY MASS INDEX: 37.61 KG/M2

## 2024-09-20 DIAGNOSIS — M50.30 DEGENERATIVE DISC DISEASE, CERVICAL: ICD-10-CM

## 2024-09-20 DIAGNOSIS — M47.812 CERVICAL SPONDYLOSIS: Primary | ICD-10-CM

## 2024-09-20 DIAGNOSIS — M47.817 LUMBOSACRAL SPONDYLOSIS WITHOUT MYELOPATHY: ICD-10-CM

## 2024-09-20 DIAGNOSIS — M51.36 LUMBAR DEGENERATIVE DISC DISEASE: ICD-10-CM

## 2024-09-20 PROCEDURE — 99214 OFFICE O/P EST MOD 30 MIN: CPT | Performed by: STUDENT IN AN ORGANIZED HEALTH CARE EDUCATION/TRAINING PROGRAM

## 2024-09-20 PROCEDURE — 1036F TOBACCO NON-USER: CPT | Performed by: STUDENT IN AN ORGANIZED HEALTH CARE EDUCATION/TRAINING PROGRAM

## 2024-09-20 PROCEDURE — G8427 DOCREV CUR MEDS BY ELIG CLIN: HCPCS | Performed by: STUDENT IN AN ORGANIZED HEALTH CARE EDUCATION/TRAINING PROGRAM

## 2024-09-20 PROCEDURE — G8417 CALC BMI ABV UP PARAM F/U: HCPCS | Performed by: STUDENT IN AN ORGANIZED HEALTH CARE EDUCATION/TRAINING PROGRAM

## 2024-09-20 NOTE — H&P (VIEW-ONLY)
Chronic Pain Clinic Note     Encounter Date: 9/20/2024     SUBJECTIVE:  Chief Complaint   Patient presents with    Neck Pain     F/u       History of Present Illness:   Fanta Petersen is a 44 y.o. female who presents with back & neck pain    Medication Refill: n/a    Current Complaints of Pain:   Location: back & neck   Radiation:  None  Severity:  Moderate  Pain Numerical Score - 7 neck today   Average: 6      Highest: 8  Lowest: 3  Character/Quality: Complains of pain that is aching, burning, shooting and stabbing  Timing: Constant  Associated symptoms: none  Numbness: no  Weakness: no  Exacerbating factors: ADLs  Alleviating factors:  sitting   Length of time pain has been present: Started about 2014 for the neck and about year ago for the back   Inciting event/injury: work injury for the neck   Bowel/Bladder incontinence: no  Falls: no  Physical Therapy: pool therapy     History of Interventions:   Surgery: No previous lumbar/cervical surgeries  Injections: about 2016    Imaging:    MRI Lumbar Spine 6/14/24     MRI cervical spine 3/22/2024    Past Medical History:   Diagnosis Date    Anxiety     Cervical stenosis of spine     Chronic fatigue     Concussion 2014    Reshelving items at work - has post-concussive syndrome    Depression     Dizziness     Dog bite 1984    right cheek and lip    Dysmetabolic syndrome X     Fibromyalgia     6/15/2023 - PT services    GERD (gastroesophageal reflux disease)     HSIL (high grade squamous intraepithelial lesion) on Pap smear of cervix 04/07/2023    Insomnia     Insulin resistance 11/19/2011    Kidney stones     Migraine     Other anxiety states 11/19/2011    PTSD (post-traumatic stress disorder)     Since concussion injury 2014    Seasonal allergies     Under care of team     PCP - Jasson Ornelas CNP - last visit 4/13/2023    Under care of team     CARDIOLOGY - Dr. Wilder - last visit 7/27/2021 - F/U 1 yr    Under care of team     NEUROLOGY - Dr. Holland Macario - last

## 2024-10-15 ENCOUNTER — HOSPITAL ENCOUNTER (OUTPATIENT)
Dept: PAIN MANAGEMENT | Facility: CLINIC | Age: 44
Discharge: HOME | End: 2024-10-15
Payer: MEDICAID

## 2024-10-15 VITALS
OXYGEN SATURATION: 98 % | TEMPERATURE: 97.7 F | SYSTOLIC BLOOD PRESSURE: 117 MMHG | RESPIRATION RATE: 10 BRPM | WEIGHT: 230 LBS | DIASTOLIC BLOOD PRESSURE: 60 MMHG | HEIGHT: 66 IN | BODY MASS INDEX: 36.96 KG/M2 | HEART RATE: 82 BPM

## 2024-10-15 DIAGNOSIS — R52 PAIN MANAGEMENT: ICD-10-CM

## 2024-10-15 PROCEDURE — 64490 INJ PARAVERT F JNT C/T 1 LEV: CPT | Performed by: STUDENT IN AN ORGANIZED HEALTH CARE EDUCATION/TRAINING PROGRAM

## 2024-10-15 PROCEDURE — 64491 INJ PARAVERT F JNT C/T 2 LEV: CPT

## 2024-10-15 PROCEDURE — 6360000002 HC RX W HCPCS: Performed by: STUDENT IN AN ORGANIZED HEALTH CARE EDUCATION/TRAINING PROGRAM

## 2024-10-15 PROCEDURE — 64490 INJ PARAVERT F JNT C/T 1 LEV: CPT

## 2024-10-15 PROCEDURE — 99152 MOD SED SAME PHYS/QHP 5/>YRS: CPT | Performed by: STUDENT IN AN ORGANIZED HEALTH CARE EDUCATION/TRAINING PROGRAM

## 2024-10-15 PROCEDURE — 64491 INJ PARAVERT F JNT C/T 2 LEV: CPT | Performed by: STUDENT IN AN ORGANIZED HEALTH CARE EDUCATION/TRAINING PROGRAM

## 2024-10-15 PROCEDURE — 2500000003 HC RX 250 WO HCPCS: Performed by: STUDENT IN AN ORGANIZED HEALTH CARE EDUCATION/TRAINING PROGRAM

## 2024-10-15 RX ORDER — LIDOCAINE HYDROCHLORIDE 20 MG/ML
INJECTION, SOLUTION EPIDURAL; INFILTRATION; INTRACAUDAL; PERINEURAL
Status: COMPLETED | OUTPATIENT
Start: 2024-10-15 | End: 2024-10-15

## 2024-10-15 RX ORDER — MIDAZOLAM HYDROCHLORIDE 2 MG/2ML
INJECTION, SOLUTION INTRAMUSCULAR; INTRAVENOUS
Status: COMPLETED | OUTPATIENT
Start: 2024-10-15 | End: 2024-10-15

## 2024-10-15 RX ADMIN — MIDAZOLAM HYDROCHLORIDE 2 MG: 1 INJECTION, SOLUTION INTRAMUSCULAR; INTRAVENOUS at 11:34

## 2024-10-15 RX ADMIN — LIDOCAINE HYDROCHLORIDE 5 ML: 20 INJECTION, SOLUTION EPIDURAL; INFILTRATION; INTRACAUDAL; PERINEURAL at 11:36

## 2024-10-15 ASSESSMENT — PAIN DESCRIPTION - DESCRIPTORS: DESCRIPTORS: NUMBNESS;BURNING

## 2024-10-15 ASSESSMENT — PAIN - FUNCTIONAL ASSESSMENT
PAIN_FUNCTIONAL_ASSESSMENT: 0-10
PAIN_FUNCTIONAL_ASSESSMENT: NONE - DENIES PAIN

## 2024-10-15 NOTE — INTERVAL H&P NOTE
Update History & Physical    The patient's History and Physical of September 20, 2024 was reviewed with the patient and I examined the patient. There was no change. The surgical site was confirmed by the patient and me.     Plan: The risks, benefits, expected outcome, and alternative to the recommended procedure have been discussed with the patient. Patient understands and wants to proceed with the procedure.     ASA CLASSIFICATION  2  MP   CLASSIFICATION  2    Electronically signed by Cristopher Saucedo DO on 10/15/2024 at 11:10 AM

## 2024-10-15 NOTE — OP NOTE
PROCEDURE PERFORMED: Bilateral Cervical Medial Branch Block using Fluoroscopy    PREOPERATIVE DIAGNOSIS: Bilateral neck pain/cervical spondylosis    INDICATIONS: Chronic neck pain    The patient's history and physical exam were reviewed.  The risk, benefits, and alternatives of the procedure were discussed and all questions were answered to the patient's satisfaction.  The patient agreed to proceed and written informed consent was obtained.    POSTOPERATIVE DIAGNOSIS: Same    PHYSICIAN:  Dr. Cristopher Saucedo DO    ANESTHESIA:  LOCAL    ASSISTANT:  NONE    PATHOLOGY:  NONE    ESTIMATED BLOOD LOSS:  N/A    IMPLANTS:  NONE    PROCEDURE DESCRIPTION: Diagnostic bilateral cervical medial branch block using fluoroscopy    The patient was placed on the operative bed in prone position.  The area was prepped with  Chlorhexidine.  The area was then draped in a sterile fashion.    Targeted levels: Bilateral cervical C3, C4, C5 medial branch block    An AP  fluoroscopic film was obtained to identify the levels.  At each cervical medial branch, a 25-gauge 3-1/2inch needle was inserted under AP fluoroscopic projections until bone was contacted.  Then the needle tip was advanced to contact the centroid of the lateral articular pillar at each respective level.  Aspiration of each needle was negative for blood, CSF and paresthesia prior to injection.  The nerve block was then performed by injecting 0.5 mL lidocaine 2% through each needle.  The needles were then removed and the needle sites were dressed appropriately. The same procedure was performed on the opposite side. The patient did not experience any hemodynamic or neurologic sequelae.    The patient was transferred to the postoperative care unit in stable condition.  Written discharge instructions were given to the patient.  A pain diary was given to the patient upon discharge.    COMPLICATIONS:  There were no apparent complications.  The patient tolerated the procedure

## 2024-10-15 NOTE — DISCHARGE INSTRUCTIONS
You have received a sedative/anesthetic therefore you should not consume any alcoholic beverages for 24 hours.  Do not drive or operate machinery for 24 hours.    Do not take a tub bath for 72 hours after procedure (this includes hot tubs).  You may shower, but avoid hot water to injection site.   Avoid strenuous activity TODAY especially if you experience dizziness.   Remove band-aid the next day.    Wash off any residual iodine 24 hours from today.   Do not use heat, heating pad, or any other heating device over the injection site for 3 days after the procedure.    If you experience pain after your procedure, you may continue with your current pain medication as prescribed.  (DO NOT INCREASE YOUR PAIN MEDICATION WITHOUT TALKING TO DOCTOR)  Soreness and pain at injection site is common, may use ice to reduce soreness.    Please complete pain diary as instructed. Office staff will contact you to review results.    Call [facility] at [phone] if you experience:   Fever, chills or temperature over 100    Vomiting, headache, persistent stiff neck, nausea or blurred vision   Difficulty urinating or unable to urinate within 8 hours   Increase in weakness, numbness or loss of function of limbs  Increased redness, swelling or drainage at the injection site

## 2024-10-17 ENCOUNTER — TELEPHONE (OUTPATIENT)
Dept: PAIN MANAGEMENT | Age: 44
End: 2024-10-17

## 2024-10-24 NOTE — TELEPHONE ENCOUNTER
S/P:Bilateral cervical C3, C4, C5 medial branch block      DOS: 10/15/2024    Pain  before procedure with activity : 6/7    Pain after procedure with activity: 0    Activities following procedure include:  Stretching     % of pain relief: 100%    Procedure successful: Yes    OV or OR scheduled: OR 11/05/2024

## 2024-11-05 ENCOUNTER — HOSPITAL ENCOUNTER (OUTPATIENT)
Dept: PAIN MANAGEMENT | Facility: CLINIC | Age: 44
Discharge: HOME | End: 2024-11-05
Payer: MEDICAID

## 2024-11-05 VITALS
WEIGHT: 233 LBS | DIASTOLIC BLOOD PRESSURE: 79 MMHG | RESPIRATION RATE: 14 BRPM | BODY MASS INDEX: 37.45 KG/M2 | SYSTOLIC BLOOD PRESSURE: 127 MMHG | OXYGEN SATURATION: 98 % | HEIGHT: 66 IN | TEMPERATURE: 97 F | HEART RATE: 74 BPM

## 2024-11-05 DIAGNOSIS — R52 PAIN MANAGEMENT: ICD-10-CM

## 2024-11-05 LAB — HCG, PREGNANCY URINE (POC): NEGATIVE

## 2024-11-05 PROCEDURE — 6360000002 HC RX W HCPCS: Performed by: STUDENT IN AN ORGANIZED HEALTH CARE EDUCATION/TRAINING PROGRAM

## 2024-11-05 PROCEDURE — 2500000003 HC RX 250 WO HCPCS: Performed by: STUDENT IN AN ORGANIZED HEALTH CARE EDUCATION/TRAINING PROGRAM

## 2024-11-05 PROCEDURE — 99152 MOD SED SAME PHYS/QHP 5/>YRS: CPT | Performed by: STUDENT IN AN ORGANIZED HEALTH CARE EDUCATION/TRAINING PROGRAM

## 2024-11-05 PROCEDURE — 64491 INJ PARAVERT F JNT C/T 2 LEV: CPT

## 2024-11-05 PROCEDURE — 64491 INJ PARAVERT F JNT C/T 2 LEV: CPT | Performed by: STUDENT IN AN ORGANIZED HEALTH CARE EDUCATION/TRAINING PROGRAM

## 2024-11-05 PROCEDURE — 64490 INJ PARAVERT F JNT C/T 1 LEV: CPT

## 2024-11-05 PROCEDURE — 81025 URINE PREGNANCY TEST: CPT

## 2024-11-05 PROCEDURE — 64490 INJ PARAVERT F JNT C/T 1 LEV: CPT | Performed by: STUDENT IN AN ORGANIZED HEALTH CARE EDUCATION/TRAINING PROGRAM

## 2024-11-05 RX ORDER — LIDOCAINE HYDROCHLORIDE 20 MG/ML
INJECTION, SOLUTION EPIDURAL; INFILTRATION; INTRACAUDAL; PERINEURAL
Status: COMPLETED | OUTPATIENT
Start: 2024-11-05 | End: 2024-11-05

## 2024-11-05 RX ORDER — MIDAZOLAM HYDROCHLORIDE 2 MG/2ML
INJECTION, SOLUTION INTRAMUSCULAR; INTRAVENOUS
Status: COMPLETED | OUTPATIENT
Start: 2024-11-05 | End: 2024-11-05

## 2024-11-05 RX ADMIN — MIDAZOLAM HYDROCHLORIDE 2 MG: 1 INJECTION, SOLUTION INTRAMUSCULAR; INTRAVENOUS at 11:42

## 2024-11-05 RX ADMIN — LIDOCAINE HYDROCHLORIDE 5 ML: 20 INJECTION, SOLUTION EPIDURAL; INFILTRATION; INTRACAUDAL; PERINEURAL at 11:45

## 2024-11-05 ASSESSMENT — PAIN DESCRIPTION - FREQUENCY: FREQUENCY: INTERMITTENT

## 2024-11-05 ASSESSMENT — PAIN DESCRIPTION - PAIN TYPE: TYPE: CHRONIC PAIN

## 2024-11-05 ASSESSMENT — PAIN DESCRIPTION - DESCRIPTORS: DESCRIPTORS: SHOOTING

## 2024-11-05 ASSESSMENT — PAIN DESCRIPTION - LOCATION: LOCATION: NECK

## 2024-11-05 ASSESSMENT — PAIN DESCRIPTION - ORIENTATION: ORIENTATION: LEFT

## 2024-11-05 ASSESSMENT — PAIN - FUNCTIONAL ASSESSMENT: PAIN_FUNCTIONAL_ASSESSMENT: PREVENTS OR INTERFERES SOME ACTIVE ACTIVITIES AND ADLS

## 2024-11-05 ASSESSMENT — PAIN SCALES - GENERAL: PAINLEVEL_OUTOF10: 0

## 2024-11-05 NOTE — DISCHARGE INSTRUCTIONS
You have received a sedative/anesthetic therefore you should not consume any alcoholic beverages for 24 hours.  Do not drive or operate machinery for 24 hours.    Do not take a tub bath for 72 hours after procedure (this includes hot tubs).  You may shower, but avoid hot water to injection site.   Avoid strenuous activity TODAY especially if you experience dizziness.   Remove band-aid the next day.    Wash off any residual iodine 24 hours from today.   Do not use heat, heating pad, or any other heating device over the injection site for 3 days after the procedure.    If you experience pain after your procedure, you may continue with your current pain medication as prescribed.  (DO NOT INCREASE YOUR PAIN MEDICATION WITHOUT TALKING TO DOCTOR)  Soreness and pain at injection site is common, may use ice to reduce soreness.    Please complete pain diary as instructed. Office staff will contact you to review results.    Call [facility] at [phone] if you experience:   Fever, chills or temperature over 100    Vomiting, headache, persistent stiff neck, nausea or blurred vision   Difficulty urinating or unable to urinate within 8 hours   Increase in weakness, numbness or loss of function of limbs  Increased redness, swelling or drainage at the injection site

## 2024-11-05 NOTE — H&P
Pain Pre-Op H&P Note    SUBJECTIVE:  No chief complaint on file.      History of Present Illness:   Fanta Petersen is a 44 y.o. female who presents with chronic neck pain.    Past Medical History:   Diagnosis Date    Anxiety     Cervical stenosis of spine     Chronic fatigue     Concussion 2014    Reshelving items at work - has post-concussive syndrome    Depression     Dizziness     Dog bite 1984    right cheek and lip    Dysmetabolic syndrome X     Fibromyalgia     6/15/2023 - PT services    GERD (gastroesophageal reflux disease)     HSIL (high grade squamous intraepithelial lesion) on Pap smear of cervix 04/07/2023    Insomnia     Insulin resistance 11/19/2011    Kidney stones     Migraine     Other anxiety states 11/19/2011    PTSD (post-traumatic stress disorder)     Since concussion injury 2014    Seasonal allergies     Under care of team     PCP - Jasson Ornelas CNP - last visit 4/13/2023    Under care of team     CARDIOLOGY - Dr. Wilder - last visit 7/27/2021 - F/U 1 yr    Under care of team     NEUROLOGY - Dr. Holland Macario - last visit - \"it's been awhile.\"    Under care of team     OB/GYN - Dr. Rosenthal - last visit - 5/25/2023    Under care of team     RHEUMATOLOGY - Dr. Hayward - last visit 5/24/2023    Under care of team     PSYCHOLOGY - Dr. Polk - last visit 6/2023 - sees \"every couple of weeks\"       Past Surgical History:   Procedure Laterality Date    KIDNEY SURGERY  01/01/2004    stent for kidney stone    LEEP  10/01/2012    LEEP N/A 6/26/2023    LEEP performed by Leigh Rosenthal DO at [facility] OR    WISDOM TOOTH EXTRACTION         Family History   Problem Relation Age of Onset    High Blood Pressure Mother     Alcohol Abuse Father     Cancer Maternal Grandfather         esophageal    Breast Cancer Maternal Grandfather     Alcohol Abuse Paternal Grandmother     Diabetes Paternal Grandfather        Social History     Socioeconomic History    Marital status: Single     Spouse name: Not on file

## 2024-11-11 ENCOUNTER — NURSE ONLY (OUTPATIENT)
Dept: OBGYN CLINIC | Age: 44
End: 2024-11-11
Payer: MEDICAID

## 2024-11-11 VITALS — WEIGHT: 235.8 LBS | HEIGHT: 66 IN | BODY MASS INDEX: 37.9 KG/M2

## 2024-11-11 DIAGNOSIS — N94.6 DYSMENORRHEA: ICD-10-CM

## 2024-11-11 DIAGNOSIS — Z30.42 SURVEILLANCE OF CONTRACEPTIVE INJECTION: Primary | ICD-10-CM

## 2024-11-11 PROCEDURE — 96372 THER/PROPH/DIAG INJ SC/IM: CPT

## 2024-11-11 RX ORDER — MEDROXYPROGESTERONE ACETATE 150 MG/ML
150 INJECTION, SUSPENSION INTRAMUSCULAR ONCE
Status: CANCELLED | OUTPATIENT
Start: 2024-11-11 | End: 2024-11-11

## 2024-11-11 NOTE — PROGRESS NOTES
Date of last pap: 6/3/24  Last Depo-Provera injection: 8/19/24  Side Effects if any: pt c/o not being able to looses weight  Weight loss or gain: 1.4 lb gain since last injection(8/19/24)  Serum HCG indicated: N/A  Patient declined receiving depo injection today and wants to come back and discuss other options and to be worked up for PCOS. Patient returning on 11/26. Patient reminded to use precaution with sexual intercourse to prevent pregnancy.

## 2024-11-13 ENCOUNTER — TELEPHONE (OUTPATIENT)
Dept: PAIN MANAGEMENT | Age: 44
End: 2024-11-13

## 2024-11-13 DIAGNOSIS — M54.50 CHRONIC BILATERAL LOW BACK PAIN, UNSPECIFIED WHETHER SCIATICA PRESENT: ICD-10-CM

## 2024-11-13 DIAGNOSIS — M47.817 LUMBOSACRAL SPONDYLOSIS WITHOUT MYELOPATHY: Primary | ICD-10-CM

## 2024-11-13 DIAGNOSIS — G89.29 CHRONIC BILATERAL LOW BACK PAIN, UNSPECIFIED WHETHER SCIATICA PRESENT: ICD-10-CM

## 2024-11-13 NOTE — TELEPHONE ENCOUNTER
S/P: Bilateral cervical C3, C4, C5 medial branch block     DOS: 11/05/2024    Pain  before procedure with activity : 2    Pain after procedure with activity: 0    Activities following procedure include: house work    % of pain relief: 100%    Procedure successful: Yes    OV or OR scheduled: OR 12/10/2024 & 12/31/2024

## 2024-12-10 ENCOUNTER — HOSPITAL ENCOUNTER (OUTPATIENT)
Dept: PAIN MANAGEMENT | Facility: CLINIC | Age: 44
Discharge: HOME | End: 2024-12-10
Payer: MEDICAID

## 2024-12-10 VITALS
HEART RATE: 79 BPM | DIASTOLIC BLOOD PRESSURE: 53 MMHG | RESPIRATION RATE: 14 BRPM | OXYGEN SATURATION: 96 % | SYSTOLIC BLOOD PRESSURE: 98 MMHG | HEIGHT: 66 IN | BODY MASS INDEX: 37.77 KG/M2 | WEIGHT: 235 LBS | TEMPERATURE: 97.9 F

## 2024-12-10 DIAGNOSIS — R52 PAIN MANAGEMENT: ICD-10-CM

## 2024-12-10 LAB — HCG, PREGNANCY URINE (POC): NEGATIVE

## 2024-12-10 PROCEDURE — 99152 MOD SED SAME PHYS/QHP 5/>YRS: CPT | Performed by: STUDENT IN AN ORGANIZED HEALTH CARE EDUCATION/TRAINING PROGRAM

## 2024-12-10 PROCEDURE — 6360000002 HC RX W HCPCS: Performed by: STUDENT IN AN ORGANIZED HEALTH CARE EDUCATION/TRAINING PROGRAM

## 2024-12-10 PROCEDURE — 64633 DESTROY CERV/THOR FACET JNT: CPT | Performed by: STUDENT IN AN ORGANIZED HEALTH CARE EDUCATION/TRAINING PROGRAM

## 2024-12-10 PROCEDURE — 81025 URINE PREGNANCY TEST: CPT

## 2024-12-10 PROCEDURE — 64634 DESTROY C/TH FACET JNT ADDL: CPT

## 2024-12-10 PROCEDURE — 64634 DESTROY C/TH FACET JNT ADDL: CPT | Performed by: STUDENT IN AN ORGANIZED HEALTH CARE EDUCATION/TRAINING PROGRAM

## 2024-12-10 PROCEDURE — 64633 DESTROY CERV/THOR FACET JNT: CPT

## 2024-12-10 RX ORDER — DEXAMETHASONE SODIUM PHOSPHATE 10 MG/ML
INJECTION, SOLUTION INTRAMUSCULAR; INTRAVENOUS
Status: COMPLETED | OUTPATIENT
Start: 2024-12-10 | End: 2024-12-10

## 2024-12-10 RX ORDER — LIDOCAINE HYDROCHLORIDE 20 MG/ML
INJECTION, SOLUTION EPIDURAL; INFILTRATION; INTRACAUDAL; PERINEURAL
Status: COMPLETED | OUTPATIENT
Start: 2024-12-10 | End: 2024-12-10

## 2024-12-10 RX ORDER — MIDAZOLAM HYDROCHLORIDE 2 MG/2ML
INJECTION, SOLUTION INTRAMUSCULAR; INTRAVENOUS
Status: COMPLETED | OUTPATIENT
Start: 2024-12-10 | End: 2024-12-10

## 2024-12-10 RX ORDER — LIDOCAINE HYDROCHLORIDE 10 MG/ML
INJECTION, SOLUTION EPIDURAL; INFILTRATION; INTRACAUDAL; PERINEURAL
Status: COMPLETED | OUTPATIENT
Start: 2024-12-10 | End: 2024-12-10

## 2024-12-10 RX ADMIN — LIDOCAINE HYDROCHLORIDE 2 ML: 10 INJECTION, SOLUTION EPIDURAL; INFILTRATION; INTRACAUDAL at 13:43

## 2024-12-10 RX ADMIN — DEXAMETHASONE SODIUM PHOSPHATE 10 MG: 10 INJECTION, SOLUTION INTRAMUSCULAR; INTRAVENOUS at 13:43

## 2024-12-10 RX ADMIN — LIDOCAINE HYDROCHLORIDE 5 ML: 20 INJECTION, SOLUTION EPIDURAL; INFILTRATION; INTRACAUDAL; PERINEURAL at 13:39

## 2024-12-10 RX ADMIN — MIDAZOLAM HYDROCHLORIDE 2 MG: 1 INJECTION, SOLUTION INTRAMUSCULAR; INTRAVENOUS at 13:38

## 2024-12-10 ASSESSMENT — PAIN DESCRIPTION - DESCRIPTORS: DESCRIPTORS: SHARP

## 2024-12-10 ASSESSMENT — PAIN - FUNCTIONAL ASSESSMENT
PAIN_FUNCTIONAL_ASSESSMENT: 0-10
PAIN_FUNCTIONAL_ASSESSMENT: PREVENTS OR INTERFERES SOME ACTIVE ACTIVITIES AND ADLS

## 2024-12-10 ASSESSMENT — PAIN SCALES - GENERAL: PAINLEVEL_OUTOF10: 0

## 2024-12-10 NOTE — PROGRESS NOTES
Department of Neurosurgery                                                      Follow up visit      History Obtained From:  patient    CHIEF COMPLAINT:         Chief Complaint   Patient presents with    Cervical stenosis of spinal canal       HISTORY OF PRESENT ILLNESS:       The patient is a 44 y.o. female who presents for follow up for cervical stenosis of spinal canal and cervical spondylosis.    Patient reports that she had some success with targeted nerve blocks and recently had an RFA at C3-C5. Today, she expresses that her neck feels quite sore but overall improved. With the nerve blocks she says that she achieved near total relief of her pain. She had not been experiencing any radiating upper extremity symptoms and her flexion extension XR showed no inducible listhesis.    Fanta is also experiencing significant low back pain. She expresses that this has been worsening with the colder weather. Concurrent with her low back pain she feels she is having sciatic pain bilaterally, right worse than left. On the right side she admits to the pain radiating down to the foot. On the left side the pain typically stops at the hip, but she also complains of a painful popping sensation in the left hip. When she experiences this radiating pain she feels that the adjacent musculature is having intense spasms. Her back pain is generally worse and more frequent but when her lower extremity pain is acutely exacerbated it will be quite painful. Her low back pain will be exacerbated when bending forward. She has not attempted PT for her lower back.    PAST MEDICAL HISTORY :       Past Medical History:        Diagnosis Date    Anxiety     Cervical stenosis of spine     Chronic fatigue     Concussion 2014    Reshelving items at work - has post-concussive syndrome    Depression     Dizziness     Dog bite 1984    right cheek and lip    Dysmetabolic syndrome X     Fibromyalgia     6/15/2023 - PT services    GERD

## 2024-12-10 NOTE — OP NOTE
PROCEDURE PERFORMED: Right Cervical Medial Branch Radiofrequency Ablation using Fluoroscopy    PREOPERATIVE DIAGNOSIS: Cervical spondylosis    INDICATIONS: Chronic neck pain    The patient's history and physical exam were reviewed.  The risk, benefits, and alternatives of the procedure were discussed and all questions were answered to the patient's satisfaction.  The patient agreed to proceed and written informed consent was obtained.    POSTOPERATIVE DIAGNOSIS: Same    PHYSICIAN:  Dr. Cristopher Saucedo DO    ANESTHESIA:  LOCAL    ASSISTANT:  NONE    PATHOLOGY:  NONE    ESTIMATED BLOOD LOSS:  N/A    IMPLANTS:  NONE    PROCEDURE DESCRIPTION: Right cervical medial branch radiofrequency ablation using fluoroscopy    The patient was placed on the operative bed in prone position.  The area was prepped with  Chlorhexidine.  The area was then draped in a sterile fashion.    Targeted levels: Right cervical C3, C4, C5 medial branch radiofrequency ablation    An AP  fluoroscopic image was used to identify the cervical landmarks.  The skin and soft tissues were anesthetized with lidocaine 1%.  At each cervical medial branch, a 20-gauge, 100 mm curved with 5 mm active tip probe was inserted under AP fluoroscopic projections until bone was contacted.  A lateral fluoroscopic view was then obtained, and then the needle tip was advanced to contact the centroid of the lateral articular pillar at each respective level.  Aspiration of each needle was negative for blood, CSF and paresthesia prior to injection.  Motor stimulation at 2 Hz and 1.2 V was negative.  Then, after negative aspiration, 1 mL lidocaine 2% was injected at each level prior to lesioning which was performed at 80°C for 90 seconds.  Once the lesion was complete, injectate solution containing dexamethasone 10 mg and 2 mL lidocaine 1% was injected at each site with a total of 1 mL.  The probes were then removed.  The needle sites were dressed appropriately.  The

## 2024-12-12 ENCOUNTER — TELEPHONE (OUTPATIENT)
Age: 44
End: 2024-12-12

## 2024-12-12 ENCOUNTER — OFFICE VISIT (OUTPATIENT)
Dept: NEUROSURGERY | Age: 44
End: 2024-12-12
Payer: MEDICAID

## 2024-12-12 VITALS
DIASTOLIC BLOOD PRESSURE: 75 MMHG | SYSTOLIC BLOOD PRESSURE: 109 MMHG | RESPIRATION RATE: 20 BRPM | OXYGEN SATURATION: 98 % | HEART RATE: 89 BPM | TEMPERATURE: 98 F | WEIGHT: 228 LBS | BODY MASS INDEX: 36.8 KG/M2

## 2024-12-12 DIAGNOSIS — M47.812 CERVICAL SPONDYLOSIS: Primary | ICD-10-CM

## 2024-12-12 DIAGNOSIS — M54.16 LUMBAR RADICULITIS: ICD-10-CM

## 2024-12-12 DIAGNOSIS — M51.372 DEGENERATION OF INTERVERTEBRAL DISC OF LUMBOSACRAL REGION WITH DISCOGENIC BACK PAIN AND LOWER EXTREMITY PAIN: ICD-10-CM

## 2024-12-12 PROBLEM — M47.816 LUMBAR SPONDYLOSIS: Status: ACTIVE | Noted: 2024-12-12

## 2024-12-12 PROCEDURE — 1036F TOBACCO NON-USER: CPT | Performed by: NEUROLOGICAL SURGERY

## 2024-12-12 PROCEDURE — G8427 DOCREV CUR MEDS BY ELIG CLIN: HCPCS | Performed by: NEUROLOGICAL SURGERY

## 2024-12-12 PROCEDURE — G8417 CALC BMI ABV UP PARAM F/U: HCPCS | Performed by: NEUROLOGICAL SURGERY

## 2024-12-12 PROCEDURE — 99214 OFFICE O/P EST MOD 30 MIN: CPT | Performed by: NEUROLOGICAL SURGERY

## 2024-12-12 PROCEDURE — G8484 FLU IMMUNIZE NO ADMIN: HCPCS | Performed by: NEUROLOGICAL SURGERY

## 2024-12-12 NOTE — TELEPHONE ENCOUNTER
Per Dr. Escobar, please get images of patient's MRI of spine from [facility] from March 2024.    I called [facility] radiology and they forwarded me to the file room.  No one answered so I left a detailed message for their office to please push the images into PACS for Dr. Escobar to review.    Pending.      Thank you!    Zoraida

## 2024-12-18 ENCOUNTER — TELEMEDICINE (OUTPATIENT)
Dept: OBGYN CLINIC | Age: 44
End: 2024-12-18
Payer: MEDICAID

## 2024-12-18 DIAGNOSIS — Z87.42 HISTORY OF HEAVY PERIODS: ICD-10-CM

## 2024-12-18 DIAGNOSIS — R10.2 PELVIC CRAMPING: Primary | ICD-10-CM

## 2024-12-18 PROBLEM — Z30.46 ENCOUNTER FOR NEXPLANON REMOVAL: Status: RESOLVED | Noted: 2018-12-11 | Resolved: 2024-12-18

## 2024-12-18 PROCEDURE — 99214 OFFICE O/P EST MOD 30 MIN: CPT | Performed by: ADVANCED PRACTICE MIDWIFE

## 2024-12-18 PROCEDURE — G8427 DOCREV CUR MEDS BY ELIG CLIN: HCPCS | Performed by: ADVANCED PRACTICE MIDWIFE

## 2024-12-18 ASSESSMENT — ENCOUNTER SYMPTOMS
ALLERGIC/IMMUNOLOGIC NEGATIVE: 1
EYES NEGATIVE: 1
RESPIRATORY NEGATIVE: 1
GASTROINTESTINAL NEGATIVE: 1

## 2024-12-18 NOTE — PROGRESS NOTES
Pt here for BCC  
Blood Pressure Mother    • Alcohol Abuse Father    • Cancer Maternal Grandfather         esophageal   • Breast Cancer Maternal Grandfather    • Alcohol Abuse Paternal Grandmother    • Diabetes Paternal Grandfather        PHYSICAL EXAMINATION:  Vital Signs: (As obtained by patient/caregiver or practitioner observation)    Blood pressure-  Heart rate-    Respiratory rate-    Temperature-  Pulse oximetry-     Constitutional: [x] Appears well-developed and well-nourished [x] No apparent distress      [] Abnormal-   Mental status  [x] Alert and awake  [] Oriented to person/place/time [x]Able to follow commands      Pulmonary/Chest: [x] Respiratory effort normal.  [] No visualized signs of difficulty breathing or respiratory distress        [] Abnormal-        Skin:        [x] No significant exanthematous lesions or discoloration noted on facial skin         [] Abnormal-            Psychiatric:       [x] Normal Affect [] No Hallucinations        [] Abnormal-     Other pertinent observable physical exam findings-     ASSESSMENT/PLAN:  1. Pelvic cramping  - US PELVIS COMPLETE; Future  - US NON OB TRANSVAGINAL; Future    2. History of heavy periods  - US PELVIS COMPLETE; Future  - US NON OB TRANSVAGINAL; Future  - Considering IUD vs endometrial ablation pending results of ultrasound   Discussed R/B/A to IUD placement and of IUD device.  Reviewed that Mirena is FDA approved for up to 8 years.  Reviewed that most women who use an LNG-IUD continue to ovulate but many women have a decreased or no menstrual bleeding. Reviewed common side effects include menstrual disturbances, cramping and paub, Reviewed that the overall complications with IUDs are uncommon and include expulsion, method failure, infection, and perforation. Reviewed expulsion rate is between 2 and 10% during the first year and perforation rate occurs in 1.4 per 1,000 LNG-IUD.   Reviewed for insertion patient needs to be abstinent for 2 weeks prior to placement.

## 2024-12-31 ENCOUNTER — HOSPITAL ENCOUNTER (OUTPATIENT)
Dept: PAIN MANAGEMENT | Facility: CLINIC | Age: 44
Discharge: HOME | End: 2024-12-31
Payer: MEDICAID

## 2024-12-31 VITALS
TEMPERATURE: 97.7 F | BODY MASS INDEX: 36.64 KG/M2 | HEART RATE: 76 BPM | DIASTOLIC BLOOD PRESSURE: 73 MMHG | RESPIRATION RATE: 12 BRPM | WEIGHT: 228 LBS | OXYGEN SATURATION: 97 % | HEIGHT: 66 IN | SYSTOLIC BLOOD PRESSURE: 114 MMHG

## 2024-12-31 DIAGNOSIS — R52 PAIN MANAGEMENT: ICD-10-CM

## 2024-12-31 LAB — HCG, PREGNANCY URINE (POC): NEGATIVE

## 2024-12-31 PROCEDURE — 64634 DESTROY C/TH FACET JNT ADDL: CPT | Performed by: STUDENT IN AN ORGANIZED HEALTH CARE EDUCATION/TRAINING PROGRAM

## 2024-12-31 PROCEDURE — 6360000002 HC RX W HCPCS: Performed by: STUDENT IN AN ORGANIZED HEALTH CARE EDUCATION/TRAINING PROGRAM

## 2024-12-31 PROCEDURE — 64633 DESTROY CERV/THOR FACET JNT: CPT | Performed by: STUDENT IN AN ORGANIZED HEALTH CARE EDUCATION/TRAINING PROGRAM

## 2024-12-31 PROCEDURE — 81025 URINE PREGNANCY TEST: CPT

## 2024-12-31 PROCEDURE — 99152 MOD SED SAME PHYS/QHP 5/>YRS: CPT | Performed by: STUDENT IN AN ORGANIZED HEALTH CARE EDUCATION/TRAINING PROGRAM

## 2024-12-31 PROCEDURE — 64633 DESTROY CERV/THOR FACET JNT: CPT

## 2024-12-31 PROCEDURE — 64634 DESTROY C/TH FACET JNT ADDL: CPT

## 2024-12-31 RX ORDER — LIDOCAINE HYDROCHLORIDE 20 MG/ML
INJECTION, SOLUTION EPIDURAL; INFILTRATION; INTRACAUDAL; PERINEURAL
Status: COMPLETED | OUTPATIENT
Start: 2024-12-31 | End: 2024-12-31

## 2024-12-31 RX ORDER — LIDOCAINE HYDROCHLORIDE 10 MG/ML
INJECTION, SOLUTION EPIDURAL; INFILTRATION; INTRACAUDAL; PERINEURAL
Status: COMPLETED | OUTPATIENT
Start: 2024-12-31 | End: 2024-12-31

## 2024-12-31 RX ORDER — MIDAZOLAM HYDROCHLORIDE 2 MG/2ML
INJECTION, SOLUTION INTRAMUSCULAR; INTRAVENOUS
Status: COMPLETED | OUTPATIENT
Start: 2024-12-31 | End: 2024-12-31

## 2024-12-31 RX ORDER — TRIAMCINOLONE ACETONIDE 40 MG/ML
INJECTION, SUSPENSION INTRA-ARTICULAR; INTRAMUSCULAR
Status: COMPLETED | OUTPATIENT
Start: 2024-12-31 | End: 2024-12-31

## 2024-12-31 RX ADMIN — LIDOCAINE HYDROCHLORIDE 2 ML: 10 INJECTION, SOLUTION EPIDURAL; INFILTRATION; INTRACAUDAL at 12:55

## 2024-12-31 RX ADMIN — MIDAZOLAM HYDROCHLORIDE 2 MG: 1 INJECTION, SOLUTION INTRAMUSCULAR; INTRAVENOUS at 12:50

## 2024-12-31 RX ADMIN — LIDOCAINE HYDROCHLORIDE 5 ML: 20 INJECTION, SOLUTION EPIDURAL; INFILTRATION; INTRACAUDAL; PERINEURAL at 12:53

## 2024-12-31 RX ADMIN — TRIAMCINOLONE ACETONIDE 40 MG: 40 INJECTION, SUSPENSION INTRA-ARTICULAR; INTRAMUSCULAR at 12:56

## 2024-12-31 ASSESSMENT — PAIN DESCRIPTION - DESCRIPTORS: DESCRIPTORS: THROBBING;ACHING

## 2024-12-31 ASSESSMENT — PAIN - FUNCTIONAL ASSESSMENT
PAIN_FUNCTIONAL_ASSESSMENT: NONE - DENIES PAIN
PAIN_FUNCTIONAL_ASSESSMENT: PREVENTS OR INTERFERES SOME ACTIVE ACTIVITIES AND ADLS
PAIN_FUNCTIONAL_ASSESSMENT: 0-10

## 2024-12-31 NOTE — DISCHARGE INSTRUCTIONS

## 2024-12-31 NOTE — OP NOTE
PROCEDURE PERFORMED: Left Cervical Medial Branch Radiofrequency Ablation using Fluoroscopy    PREOPERATIVE DIAGNOSIS: Cervical spondylosis    INDICATIONS: Chronic neck pain    The patient's history and physical exam were reviewed.  The risk, benefits, and alternatives of the procedure were discussed and all questions were answered to the patient's satisfaction.  The patient agreed to proceed and written informed consent was obtained.    POSTOPERATIVE DIAGNOSIS: Same    PHYSICIAN:  Dr. Cristopher Saucedo DO    ANESTHESIA:  LOCAL    ASSISTANT:  NONE    PATHOLOGY:  NONE    ESTIMATED BLOOD LOSS:  N/A    IMPLANTS:  NONE    PROCEDURE DESCRIPTION: Left cervical medial branch radiofrequency ablation using fluoroscopy    The patient was placed on the operative bed in prone position.  The area was prepped with  Chlorhexidine.  The area was then draped in a sterile fashion.    Targeted levels: Left cervical C3, C4, C5 medial branch radiofrequency ablation    An AP  fluoroscopic image was used to identify the cervical landmarks.  The skin and soft tissues were anesthetized with lidocaine 1%.  At each cervical medial branch, a 20-gauge, 100 mm curved with 5 mm active tip probe was inserted under AP fluoroscopic projections until bone was contacted.  A lateral fluoroscopic view was then obtained, and then the needle tip was advanced to contact the centroid of the lateral articular pillar at each respective level.  Aspiration of each needle was negative for blood, CSF and paresthesia prior to injection.  Motor stimulation at 2 Hz and 1.2 V was negative.  Then, after negative aspiration, 1 mL lidocaine 2% was injected at each level prior to lesioning which was performed at 80°C for 90 seconds.  Once the lesion was complete, injectate solution containing dexamethasone 10 mg and 2 mL lidocaine 1% was injected at each site with a total of 1 mL.  The probes were then removed.  The needle sites were dressed appropriately.  The patient

## 2025-01-14 ENCOUNTER — HOSPITAL ENCOUNTER (OUTPATIENT)
Dept: ULTRASOUND IMAGING | Age: 45
Discharge: HOME | End: 2025-01-16
Payer: MEDICAID

## 2025-01-14 DIAGNOSIS — Z87.42 HISTORY OF HEAVY PERIODS: ICD-10-CM

## 2025-01-14 DIAGNOSIS — R10.2 PELVIC CRAMPING: ICD-10-CM

## 2025-01-14 PROCEDURE — 76830 TRANSVAGINAL US NON-OB: CPT

## 2025-01-14 PROCEDURE — 76856 US EXAM PELVIC COMPLETE: CPT

## 2025-02-03 ENCOUNTER — TELEMEDICINE (OUTPATIENT)
Dept: OBGYN CLINIC | Age: 45
End: 2025-02-03
Payer: MEDICAID

## 2025-02-03 DIAGNOSIS — Z87.42 HISTORY OF HEAVY PERIODS: ICD-10-CM

## 2025-02-03 DIAGNOSIS — N80.03 ADENOMYOSIS: ICD-10-CM

## 2025-02-03 DIAGNOSIS — Z30.09 COUNSELING FOR BIRTH CONTROL REGARDING INTRAUTERINE DEVICE (IUD): Primary | ICD-10-CM

## 2025-02-03 PROCEDURE — 99213 OFFICE O/P EST LOW 20 MIN: CPT | Performed by: ADVANCED PRACTICE MIDWIFE

## 2025-02-03 NOTE — PROGRESS NOTES
VIRTUAL VISIT VIA TELEPHONE    _______________________________________________________________    Due to COVID 19 outbreak, patient's office visit was converted to telephone virtual visit. Attempted MyChart virtual visit, technical difficulties.   Patient was contacted and agreed to proceed with a telephone visit.  The risks and benefits of converting to a telephone virtual visit were discussed in light of the current infectious disease epidemic.  Patient also understood that insurance coverage and co-pays are up to their individual insurance plans.  _______________________________________________________________    [facility] OBSTETR[facility]YNECOLOGY  [address]   [address] [address]  Dept: [phone]  Dept Fax: [phone]    Fanta Petersen is a 44 y.o. female who was contacted via telephone to discuss her medical conditions/complaints as noted below.     HPI:     Fanta was seen 12/18/24 to discuss treatment plan for history of heavy and long, painful menstrual cycles. She has been on OCPs, Nexplanon, Depo. Most recently she has been on Depo, last injection 8/19/24 and she has not yet started a period (about 6 months).  She does continue to get cramps as if she is getting a period, but does not bleed. Urine pregnancy tests have been negative. She had normal FSH/estradiol 7/2024 per a [facility] provider.   Pelvic ultrasound completed 1/14/25 showed normal size uterus with normal endometrial stripe, signs of adenomyosis present. Ovaries are normal.     She is considering IUD, has questions about threads, concerns about causing low libido.   Also still wonders about uterine ablation.     Past Medical History:   Diagnosis Date    Anxiety     Cervical stenosis of spine     Chronic fatigue     Concussion 2014    Reshelving items at work - has post-concussive syndrome    Depression     Dizziness     Dog bite 1984    right cheek

## 2025-02-10 PROBLEM — N80.03 ADENOMYOSIS: Status: ACTIVE | Noted: 2025-02-10

## 2025-03-14 ENCOUNTER — OFFICE VISIT (OUTPATIENT)
Dept: PAIN MANAGEMENT | Age: 45
End: 2025-03-14
Payer: MEDICAID

## 2025-03-14 VITALS — BODY MASS INDEX: 36.64 KG/M2 | HEIGHT: 66 IN | WEIGHT: 228 LBS

## 2025-03-14 DIAGNOSIS — M47.817 LUMBOSACRAL SPONDYLOSIS WITHOUT MYELOPATHY: ICD-10-CM

## 2025-03-14 DIAGNOSIS — M50.30 DEGENERATIVE DISC DISEASE, CERVICAL: ICD-10-CM

## 2025-03-14 DIAGNOSIS — M53.3 SACROILIAC JOINT PAIN: Primary | ICD-10-CM

## 2025-03-14 DIAGNOSIS — M47.812 CERVICAL SPONDYLOSIS: ICD-10-CM

## 2025-03-14 PROCEDURE — 1036F TOBACCO NON-USER: CPT | Performed by: STUDENT IN AN ORGANIZED HEALTH CARE EDUCATION/TRAINING PROGRAM

## 2025-03-14 PROCEDURE — 99214 OFFICE O/P EST MOD 30 MIN: CPT | Performed by: STUDENT IN AN ORGANIZED HEALTH CARE EDUCATION/TRAINING PROGRAM

## 2025-03-14 PROCEDURE — G8427 DOCREV CUR MEDS BY ELIG CLIN: HCPCS | Performed by: STUDENT IN AN ORGANIZED HEALTH CARE EDUCATION/TRAINING PROGRAM

## 2025-03-14 PROCEDURE — G8417 CALC BMI ABV UP PARAM F/U: HCPCS | Performed by: STUDENT IN AN ORGANIZED HEALTH CARE EDUCATION/TRAINING PROGRAM

## 2025-03-14 RX ORDER — LIDOCAINE 50 MG/G
1 PATCH TOPICAL DAILY
Qty: 10 PATCH | Refills: 0 | Status: SHIPPED | OUTPATIENT
Start: 2025-03-14 | End: 2025-03-24

## 2025-03-14 NOTE — PROGRESS NOTES
Chronic Pain Clinic Note     Encounter Date: 3/14/2025     SUBJECTIVE:  Chief Complaint   Patient presents with    Neck Pain    Back Pain       History of Present Illness:   Fanta Petersen is a 44 y.o. female who presents with back & neck pain    Medication Refill: n/a    Current Complaints of Pain:   Location: back & neck   Radiation:  None  Severity:  Moderate  Pain Numerical Score - 8  Average: 6      Highest: 10  Lowest: 3  Character/Quality: Complains of pain that is aching, burning, shooting and stabbing  Timing: Constant  Associated symptoms: none  Numbness: no  Weakness: no  Exacerbating factors: ADLs  Alleviating factors:  sitting   Length of time pain has been present: Started about 2014 for the neck and about year ago for the back   Inciting event/injury: work injury for the neck, possibly the cause for back pain as well.   Bowel/Bladder incontinence: no  Falls: no  Physical Therapy: pool therapy     History of Interventions:   Surgery: No previous lumbar/cervical surgeries  Injections: yes    Imaging:    MRI Lumbar Spine 6/14/24     MRI cervical spine 3/22/2024    Past Medical History:   Diagnosis Date    Anxiety     Cervical stenosis of spine     Chronic fatigue     Concussion 2014    Reshelving items at work - has post-concussive syndrome    Depression     Dizziness     Dog bite 1984    right cheek and lip    Dysmetabolic syndrome X     Fibromyalgia     6/15/2023 - PT services    GERD (gastroesophageal reflux disease)     HSIL (high grade squamous intraepithelial lesion) on Pap smear of cervix 04/07/2023    Insomnia     Insulin resistance 11/19/2011    Kidney stones     Migraine     Other anxiety states 11/19/2011    PTSD (post-traumatic stress disorder)     Since concussion injury 2014    Seasonal allergies     Under care of team     PCP - Jasson Ornelas CNP - last visit 4/13/2023    Under care of team     CARDIOLOGY - Dr. Wilder - last visit 7/27/2021 - F/U 1 yr    Under care of team     NEUROLOGY -

## 2025-03-14 NOTE — H&P (VIEW-ONLY)
along the PSIS bilaterally  Positive DELVIN bilaterally  Positive SI joint compression test bilaterally  Positive thigh thrust bilaterally     NEUROMUSCULAR:  Patient ambulates with cane  Gait is nonantalgic  Sensation to light touch is grossly intact in lower extremities  Strength is grossly intact in lower extremities    Special Tests:  Lumbar facet loading is positive bilaterally  Seated straight leg raise is negative bilaterally    DIAGNOSIS:    ICD-10-CM    1. Sacroiliac joint pain  M53.3 SI JOINT INJECTIONS / SI NERVE BLOCK     lidocaine (LIDODERM) 5 %      2. Lumbosacral spondylosis without myelopathy  M47.817       3. Cervical spondylosis  M47.812       4. Degenerative disc disease, cervical  M50.30           ASSESSMENT:    Fanta Petersen is a 44 y.o.female who presents with chronic neck pain and low back pain. To review, patient has had symptoms for the last 5 years without recent injury or trauma.  She has not had cervical spine surgery or lumbar spine surgery.    The patient's history and physical examination are consistent with cervical spondylosis as the patient has axial neck pain that is mechanical in nature. There is tenderness to palpation along the cervical paraspinal musculature with positive cervical facet loading bilaterally.  The cervical MRI on 3/22/2024 reveals multilevel degenerative changes with facet hypertrophy throughout the cervical spine specifically at C3-4 and C4-5 facet joints. She underwent right and left cervical C3, C4, C5 medial branch radiofrequency ablation on 12/10/2024 and 12/31/2024 with 100% improvement in pain and function.    Since last visit, the patient reports worsening low back and gluteal pain.  Her history and physical examination are consistent with sacroiliac joint pain. The patient has tenderness to palpation along the PSIS bilaterally and positive 3 out of 5 provocative maneuvers.  Therefore, I will plan for bilateral sacroiliac joint injections.

## 2025-04-01 ENCOUNTER — HOSPITAL ENCOUNTER (OUTPATIENT)
Dept: PAIN MANAGEMENT | Facility: CLINIC | Age: 45
Discharge: HOME | End: 2025-04-01
Payer: MEDICAID

## 2025-04-01 ENCOUNTER — HOSPITAL ENCOUNTER (EMERGENCY)
Age: 45
Discharge: HOME | End: 2025-04-01
Attending: EMERGENCY MEDICINE
Payer: MEDICAID

## 2025-04-01 VITALS
RESPIRATION RATE: 10 BRPM | BODY MASS INDEX: 36.48 KG/M2 | DIASTOLIC BLOOD PRESSURE: 79 MMHG | HEART RATE: 75 BPM | HEIGHT: 66 IN | SYSTOLIC BLOOD PRESSURE: 123 MMHG | TEMPERATURE: 97.4 F | WEIGHT: 227 LBS | OXYGEN SATURATION: 96 %

## 2025-04-01 VITALS
SYSTOLIC BLOOD PRESSURE: 127 MMHG | RESPIRATION RATE: 18 BRPM | HEART RATE: 91 BPM | OXYGEN SATURATION: 98 % | HEIGHT: 66 IN | DIASTOLIC BLOOD PRESSURE: 51 MMHG | TEMPERATURE: 98.5 F | WEIGHT: 225 LBS | BODY MASS INDEX: 36.16 KG/M2

## 2025-04-01 DIAGNOSIS — M54.32 BILATERAL SCIATICA: Primary | ICD-10-CM

## 2025-04-01 DIAGNOSIS — M54.31 BILATERAL SCIATICA: Primary | ICD-10-CM

## 2025-04-01 DIAGNOSIS — R52 PAIN MANAGEMENT: ICD-10-CM

## 2025-04-01 LAB
HCG, PREGNANCY URINE (POC): NEGATIVE
HCG, PREGNANCY URINE (POC): NEGATIVE

## 2025-04-01 PROCEDURE — 99152 MOD SED SAME PHYS/QHP 5/>YRS: CPT | Performed by: STUDENT IN AN ORGANIZED HEALTH CARE EDUCATION/TRAINING PROGRAM

## 2025-04-01 PROCEDURE — 81025 URINE PREGNANCY TEST: CPT

## 2025-04-01 PROCEDURE — 27096 INJECT SACROILIAC JOINT: CPT | Performed by: STUDENT IN AN ORGANIZED HEALTH CARE EDUCATION/TRAINING PROGRAM

## 2025-04-01 PROCEDURE — 6360000002 HC RX W HCPCS: Performed by: STUDENT IN AN ORGANIZED HEALTH CARE EDUCATION/TRAINING PROGRAM

## 2025-04-01 PROCEDURE — 96372 THER/PROPH/DIAG INJ SC/IM: CPT

## 2025-04-01 PROCEDURE — 6360000002 HC RX W HCPCS: Performed by: EMERGENCY MEDICINE

## 2025-04-01 PROCEDURE — 6370000000 HC RX 637 (ALT 250 FOR IP): Performed by: EMERGENCY MEDICINE

## 2025-04-01 PROCEDURE — G0260 INJ FOR SACROILIAC JT ANESTH: HCPCS

## 2025-04-01 PROCEDURE — 99284 EMERGENCY DEPT VISIT MOD MDM: CPT

## 2025-04-01 RX ORDER — MORPHINE SULFATE 2 MG/ML
2 INJECTION, SOLUTION INTRAMUSCULAR; INTRAVENOUS ONCE
Status: DISCONTINUED | OUTPATIENT
Start: 2025-04-01 | End: 2025-04-01

## 2025-04-01 RX ORDER — MIDAZOLAM HYDROCHLORIDE 2 MG/2ML
INJECTION, SOLUTION INTRAMUSCULAR; INTRAVENOUS
Status: COMPLETED | OUTPATIENT
Start: 2025-04-01 | End: 2025-04-01

## 2025-04-01 RX ORDER — CYCLOBENZAPRINE HCL 10 MG
10 TABLET ORAL 3 TIMES DAILY PRN
Qty: 21 TABLET | Refills: 0 | Status: SHIPPED | OUTPATIENT
Start: 2025-04-01 | End: 2025-04-11

## 2025-04-01 RX ORDER — AZELASTINE HYDROCHLORIDE 0.5 MG/ML
1 SOLUTION/ DROPS OPHTHALMIC 2 TIMES DAILY
COMMUNITY

## 2025-04-01 RX ORDER — CYCLOBENZAPRINE HCL 10 MG
10 TABLET ORAL ONCE
Status: COMPLETED | OUTPATIENT
Start: 2025-04-01 | End: 2025-04-01

## 2025-04-01 RX ORDER — FENTANYL CITRATE 0.05 MG/ML
25 INJECTION, SOLUTION INTRAMUSCULAR; INTRAVENOUS ONCE
Status: COMPLETED | OUTPATIENT
Start: 2025-04-01 | End: 2025-04-01

## 2025-04-01 RX ORDER — LIDOCAINE HYDROCHLORIDE 10 MG/ML
INJECTION, SOLUTION EPIDURAL; INFILTRATION; INTRACAUDAL; PERINEURAL
Status: COMPLETED | OUTPATIENT
Start: 2025-04-01 | End: 2025-04-01

## 2025-04-01 RX ORDER — TRIAMCINOLONE ACETONIDE 40 MG/ML
INJECTION, SUSPENSION INTRA-ARTICULAR; INTRAMUSCULAR
Status: COMPLETED | OUTPATIENT
Start: 2025-04-01 | End: 2025-04-01

## 2025-04-01 RX ADMIN — CYCLOBENZAPRINE 10 MG: 10 TABLET, FILM COATED ORAL at 22:45

## 2025-04-01 RX ADMIN — FENTANYL CITRATE 25 MCG: 0.05 INJECTION, SOLUTION INTRAMUSCULAR; INTRAVENOUS at 22:51

## 2025-04-01 RX ADMIN — LIDOCAINE HYDROCHLORIDE 1 ML: 10 INJECTION, SOLUTION EPIDURAL; INFILTRATION; INTRACAUDAL; PERINEURAL at 14:24

## 2025-04-01 RX ADMIN — MIDAZOLAM HYDROCHLORIDE 2 MG: 1 INJECTION, SOLUTION INTRAMUSCULAR; INTRAVENOUS at 14:22

## 2025-04-01 RX ADMIN — TRIAMCINOLONE ACETONIDE 80 MG: 40 INJECTION, SUSPENSION INTRA-ARTICULAR; INTRAMUSCULAR at 14:24

## 2025-04-01 ASSESSMENT — PAIN DESCRIPTION - DESCRIPTORS
DESCRIPTORS: SHARP;ACHING;SQUEEZING
DESCRIPTORS: ACHING
DESCRIPTORS: SORE
DESCRIPTORS: SORE

## 2025-04-01 ASSESSMENT — ENCOUNTER SYMPTOMS
PHOTOPHOBIA: 0
ABDOMINAL PAIN: 0
NAUSEA: 0
VOMITING: 0
CHEST TIGHTNESS: 0
FACIAL SWELLING: 0
VOICE CHANGE: 0
TROUBLE SWALLOWING: 0
COLOR CHANGE: 0
EYE PAIN: 0
SHORTNESS OF BREATH: 0
BACK PAIN: 1

## 2025-04-01 ASSESSMENT — PAIN DESCRIPTION - LOCATION
LOCATION: BACK

## 2025-04-01 ASSESSMENT — PAIN - FUNCTIONAL ASSESSMENT
PAIN_FUNCTIONAL_ASSESSMENT: 0-10
PAIN_FUNCTIONAL_ASSESSMENT: PREVENTS OR INTERFERES WITH ALL ACTIVE AND SOME PASSIVE ACTIVITIES
PAIN_FUNCTIONAL_ASSESSMENT: 0-10
PAIN_FUNCTIONAL_ASSESSMENT: NONE - DENIES PAIN

## 2025-04-01 ASSESSMENT — PAIN DESCRIPTION - ORIENTATION
ORIENTATION: LOWER;LEFT;RIGHT
ORIENTATION: LOWER
ORIENTATION: LOWER

## 2025-04-01 ASSESSMENT — PAIN SCALES - GENERAL
PAINLEVEL_OUTOF10: 10
PAINLEVEL_OUTOF10: 10

## 2025-04-01 ASSESSMENT — LIFESTYLE VARIABLES
HOW MANY STANDARD DRINKS CONTAINING ALCOHOL DO YOU HAVE ON A TYPICAL DAY: PATIENT DOES NOT DRINK
HOW OFTEN DO YOU HAVE A DRINK CONTAINING ALCOHOL: NEVER

## 2025-04-01 NOTE — OP NOTE
PROCEDURE PERFORMED: Bilateral Sacroiliac joint injection    PREOPERATIVE DIAGNOSIS: Lumbago and sacroiliac joint pain    INDICATIONS: Chronic low back pain    The patient's history and physical exam were reviewed.  The risk, benefits, and alternatives of the procedure were discussed and all questions were answered to the patient's satisfaction.  The patient agreed to proceed and written informed consent was obtained.    POSTOPERATIVE DIAGNOSIS: Same    PHYSICIAN:  Dr. Cristopher Saucedo DO    ANESTHESIA:  LOCAL    ASSISTANT:  NONE    PATHOLOGY:  NONE    ESTIMATED BLOOD LOSS:  N/A    IMPLANTS:  NONE    PROCEDURE DESCRIPTION: Bilateral sacroiliac joint injection using fluoroscopy    The patient was placed on the operative bed in prone position.  The area was prepped with  Chlorhexidine.  The area was then draped in a sterile fashion.  The targeted side of the sacroiliac joint was identified and marked under AP fluoroscopy.  The fluoroscopic beam was then obliqued until the anterior and posterior margins of the joint were aligned.  The inferior margin of the joint was identified.  A 25-gauge 3-1/2 inch Quincke needle was directed toward the identified point under fluoroscopic guidance.  The joint space was then entered and negative aspiration was confirmed. Then, after negative aspiration, the injectate was easily injected.  The injectate consisted of 40 mg triamcinolone and 1 mL lidocaine 1%.  The needle was then removed. The same procedure was performed on the opposite side. The needle site was dressed appropriately.    The patient was transferred to the postoperative care unit in stable condition.  Written discharge instructions were given to the patient.    COMPLICATIONS:  There were no apparent complications.  The patient tolerated the procedure well.     SEDATION NOTE:     ASA CLASSIFICATION  2  MP   CLASSIFICATION  2     Moderate intravenous conscious sedation was supervised by Dr. Saucedo  The patient was

## 2025-04-01 NOTE — INTERVAL H&P NOTE
Update History & Physical    The patient's History and Physical of March 14, 2025 was reviewed with the patient and I examined the patient. There was no change. The surgical site was confirmed by the patient and me.     Plan: The risks, benefits, expected outcome, and alternative to the recommended procedure have been discussed with the patient. Patient understands and wants to proceed with the procedure.     ASA CLASSIFICATION  2  MP   CLASSIFICATION  2    Electronically signed by Cristopher Saucedo DO on 4/1/2025 at 1:52 PM

## 2025-04-01 NOTE — DISCHARGE INSTRUCTIONS

## 2025-04-02 ENCOUNTER — TELEPHONE (OUTPATIENT)
Dept: PAIN MANAGEMENT | Age: 45
End: 2025-04-02

## 2025-04-02 NOTE — ED NOTES
Mode of arrival (squad #, walk in, police, etc) : Walk-in         Chief complaint(s): Back pain        Arrival Note (brief scenario, treatment PTA, etc).: Pt c/o back pain that started this evening after waking from a nap. Pt had a Bilateral Sacroiliac joint injection procedure done earlier today and pt stated the doctor said that she would be better after the injections. Pt reports feeling worse after the injections and is currently in 10/10 pain in the sacral area at rest.

## 2025-04-02 NOTE — ED PROVIDER NOTES
EMERGENCY DEPARTMENT ENCOUNTER    Pt Name: Fanta Petersen  MRN: [id number]  Birthdate 1980  Date of evaluation: 4/1/25  CHIEF COMPLAINT       Chief Complaint   Patient presents with    Back Pain     HISTORY OF PRESENT ILLNESS   44-year-old female presenting to the ER complaining of bilateral lower back pain that radiates down both the left lower extremities.  Patient did have bilateral sacroiliac shots from the pain management physician that were steroid shots.  The patient does utilize tramadol at home.  Patient was hoping for some relief because of the pain.  Patient is still ambulating and still has control of her bowel movements and urine.    The history is provided by the patient.   Back Pain  Pain location: bilateral lower.  Associated symptoms: no abdominal pain, no chest pain, no dysuria and no headaches            REVIEW OF SYSTEMS     Review of Systems   Constitutional:  Negative for activity change, appetite change and fatigue.   HENT:  Negative for facial swelling, trouble swallowing and voice change.    Eyes:  Negative for photophobia and pain.   Respiratory:  Negative for chest tightness and shortness of breath.    Cardiovascular:  Negative for chest pain and palpitations.   Gastrointestinal:  Negative for abdominal pain, nausea and vomiting.   Genitourinary:  Negative for dysuria and urgency.   Musculoskeletal:  Positive for back pain. Negative for arthralgias.   Skin:  Negative for color change and rash.   Neurological:  Negative for dizziness, syncope and headaches.   Psychiatric/Behavioral:  Negative for behavioral problems and hallucinations.      PASTMEDICAL HISTORY     Past Medical History:   Diagnosis Date    Anxiety     Cervical stenosis of spine     Chronic fatigue     Concussion 2014    Reshelving items at work - has post-concussive syndrome    Depression     Dizziness     Dog bite 1984    right cheek and lip    Dysmetabolic syndrome X     Fibromyalgia     6/15/2023 - PT services    GERD

## 2025-04-02 NOTE — TELEPHONE ENCOUNTER
Pt calling stating after her procedure yesterday, at one point she got out of bed to use the restroom and was in severe pain. Location was entire lower back down to knees. Pt was able to use restroom and get back into bed. Pain is gone now but did go to [facility].     Please advise.

## 2025-04-07 DIAGNOSIS — Z30.09 COUNSELING FOR BIRTH CONTROL REGARDING INTRAUTERINE DEVICE (IUD): Primary | ICD-10-CM

## 2025-04-07 RX ORDER — MISOPROSTOL 200 UG/1
400 TABLET ORAL ONCE
Qty: 2 TABLET | Refills: 0 | Status: SHIPPED | OUTPATIENT
Start: 2025-04-07 | End: 2025-04-07

## 2025-04-08 ENCOUNTER — TELEMEDICINE (OUTPATIENT)
Dept: NEUROSURGERY | Age: 45
End: 2025-04-08
Payer: MEDICAID

## 2025-04-08 DIAGNOSIS — M46.1 BILATERAL SACROILIITIS: Primary | ICD-10-CM

## 2025-04-08 PROCEDURE — G8428 CUR MEDS NOT DOCUMENT: HCPCS | Performed by: NEUROLOGICAL SURGERY

## 2025-04-08 PROCEDURE — 99214 OFFICE O/P EST MOD 30 MIN: CPT | Performed by: NEUROLOGICAL SURGERY

## 2025-04-08 NOTE — PROGRESS NOTES
2025    TELEHEALTH EVALUATION -- Audio/Visual    HPI:  Fanta Petersen (:  1980) has requested an audio/video evaluation for the following concern(s):    Cervical spondylolysis, lumbar radiculitis, and degenerative of interverbal disc of lumbosacral region with discogenic back pain and lower extremity pain.    The patient reports her neck and back symptoms have improved with pain management. She received ablation treatment. On , she had S1 injection done. After she says it was excruciating when trying to walk. The pain was local horizontally to the incisional area that radiated down to her bilateral lower extremities down to her knees. She notes it was difficult to get out of bed and going to the restroom. She then presented to the ER at [facility]. At that time she was initiated on Flexeril and received an injection. The next day she felt better. Since then her pain has significantly improved with the injection treatment. Approximately about a 95% reduction of pain. However, she continues to experience muscle soreness.    The patient was previously seen by me on 2024. \"Patient reports that she had some success with targeted nerve blocks and recently had an RFA at C3-C5. Today, she expresses that her neck feels quite sore but overall improved. With the nerve blocks she says that she achieved near total relief of her pain. She had not been experiencing any radiating upper extremity symptoms and her flexion extension XR showed no inducible listhesis.  Fanta is also experiencing significant low back pain. She expresses that this has been worsening with the colder weather. Concurrent with her low back pain she feels she is having sciatic pain bilaterally, right worse than left. On the right side she admits to the pain radiating down to the foot. On the left side the pain typically stops at the hip, but she also complains of a painful popping sensation in the left hip. When she experiences this

## 2025-04-25 ENCOUNTER — OFFICE VISIT (OUTPATIENT)
Dept: PAIN MANAGEMENT | Age: 45
End: 2025-04-25
Payer: MEDICAID

## 2025-04-25 VITALS — BODY MASS INDEX: 36.16 KG/M2 | HEIGHT: 66 IN | WEIGHT: 225 LBS

## 2025-04-25 DIAGNOSIS — M53.3 SACROILIAC JOINT PAIN: Primary | ICD-10-CM

## 2025-04-25 DIAGNOSIS — M47.817 LUMBOSACRAL SPONDYLOSIS WITHOUT MYELOPATHY: ICD-10-CM

## 2025-04-25 DIAGNOSIS — M50.30 DEGENERATIVE DISC DISEASE, CERVICAL: ICD-10-CM

## 2025-04-25 DIAGNOSIS — M47.812 CERVICAL SPONDYLOSIS: ICD-10-CM

## 2025-04-25 PROCEDURE — 99213 OFFICE O/P EST LOW 20 MIN: CPT | Performed by: STUDENT IN AN ORGANIZED HEALTH CARE EDUCATION/TRAINING PROGRAM

## 2025-04-25 PROCEDURE — 1036F TOBACCO NON-USER: CPT | Performed by: STUDENT IN AN ORGANIZED HEALTH CARE EDUCATION/TRAINING PROGRAM

## 2025-04-25 PROCEDURE — G8417 CALC BMI ABV UP PARAM F/U: HCPCS | Performed by: STUDENT IN AN ORGANIZED HEALTH CARE EDUCATION/TRAINING PROGRAM

## 2025-04-25 PROCEDURE — G8427 DOCREV CUR MEDS BY ELIG CLIN: HCPCS | Performed by: STUDENT IN AN ORGANIZED HEALTH CARE EDUCATION/TRAINING PROGRAM

## 2025-04-25 NOTE — PROGRESS NOTES
Chronic Pain Clinic Note     Encounter Date: 4/25/2025     SUBJECTIVE:  Chief Complaint   Patient presents with    Neck Pain    Back Pain     Procedure f/u       History of Present Illness:   Fanta Petersen is a 44 y.o. female who presents with back & neck pain    Medication Refill: n/a    Current Complaints of Pain:   Location: back & neck   Radiation:  None  Severity:  Moderate  Pain Numerical Score - 1 nerve pain 7 muscle pain  Average: 6      Highest: 10  Lowest: 3  Character/Quality: Complains of pain that is aching, burning, shooting and stabbing  Timing: Constant  Associated symptoms: none  Numbness: no  Weakness: no  Exacerbating factors: ADLs  Alleviating factors:  sitting   Length of time pain has been present: Started about 2014 for the neck and about year ago for the back   Inciting event/injury: work injury for the neck, possibly the cause for back pain as well.   Bowel/Bladder incontinence: no  Falls: no  Physical Therapy: pool therapy     History of Interventions:   Surgery: No previous lumbar/cervical surgeries  Injections: SIJ 4/1/25 received 90% relief     Imaging:    MRI Lumbar Spine 6/14/24     MRI cervical spine 3/22/2024    Past Medical History:   Diagnosis Date    Anxiety     Cervical stenosis of spine     Chronic fatigue     Concussion 2014    Reshelving items at work - has post-concussive syndrome    Depression     Dizziness     Dog bite 1984    right cheek and lip    Dysmetabolic syndrome X     Fibromyalgia     6/15/2023 - PT services    GERD (gastroesophageal reflux disease)     HSIL (high grade squamous intraepithelial lesion) on Pap smear of cervix 04/07/2023    Insomnia     Insulin resistance 11/19/2011    Kidney stones     Migraine     Other anxiety states 11/19/2011    PTSD (post-traumatic stress disorder)     Since concussion injury 2014    Seasonal allergies     Under care of team     PCP - Jasson Ornelas CNP - last visit 4/13/2023    Under care of team     CARDIOLOGY - Dr. Wilder

## 2025-05-21 NOTE — PROGRESS NOTES
Patient is here for IUD insertion.   LMP?- unknown, been off the depo for few months with no period.   Last intercourse? 3 weeks ago.

## 2025-05-22 ENCOUNTER — PROCEDURE VISIT (OUTPATIENT)
Dept: OBGYN CLINIC | Age: 45
End: 2025-05-22

## 2025-05-22 VITALS
WEIGHT: 228.6 LBS | BODY MASS INDEX: 36.9 KG/M2 | DIASTOLIC BLOOD PRESSURE: 70 MMHG | SYSTOLIC BLOOD PRESSURE: 116 MMHG | HEART RATE: 84 BPM

## 2025-05-22 DIAGNOSIS — Z53.8 UNSUCCESSFUL IUD INSERTION: Primary | ICD-10-CM

## 2025-05-22 DIAGNOSIS — N80.03 ADENOMYOSIS: ICD-10-CM

## 2025-05-22 DIAGNOSIS — N94.6 DYSMENORRHEA: ICD-10-CM

## 2025-05-22 DIAGNOSIS — N88.2: ICD-10-CM

## 2025-05-22 NOTE — PROGRESS NOTES
[facility]STETR[facility] AND [facility]YNECOLOGY  [address]   [address]  [facility] [address]  Dept: [phone]    Patient Name: Fanta Petersen  Patient Age: 44 y.o.  Date of Visit: 5/22/2025    SUBJECTIVE:    CC:    Chief Complaint   Patient presents with    Procedure     Fanta presents today for insertion of Mirena IUD for her complaint of dysmenorrhea.  Fanta understands the risks and benefits of the intrauterine device insertion and desires to proceed with its insertion.    History: Fanta has been on birth control since 2000 for heavy/long and painful periods. Reports trying OCPs, Nexpalnon, Depo. Most recent Depo 8/2024. No bleeding since then but she does have cramps as if a period is coming. She had normal FSH/estradiol 7/2024 per a [facility] provider.  Pelvic ultrasound completed 1/14/25 showed normal size uterus with normal endometrial stripe, signs of adenomyosis present. Ovaries are normal.     History of LEEP x 2    Note: procedure not successful - see below    OBJECTIVE:  No LMP recorded.    /70   Pulse 84   Wt 103.7 kg (228 lb 9.6 oz)   BMI 36.90 kg/m²     GYN HISTORY:    History of STI: no   New Sex Partner (within 3 months): no   Negative Gonorrhea/Chlamydia testing: declines     Past Medical History:   Diagnosis Date    Anxiety     Cervical stenosis of spine     Chronic fatigue     Concussion 2014    Reshelving items at work - has post-concussive syndrome    Depression     Dizziness     Dog bite 1984    right cheek and lip    Dysmetabolic syndrome X     Fibromyalgia     6/15/2023 - PT services    GERD (gastroesophageal reflux disease)     HSIL (high grade squamous intraepithelial lesion) on Pap smear of cervix 04/07/2023    Insomnia     Insulin resistance 11/19/2011    Kidney stones     Migraine     Other anxiety states 11/19/2011    PTSD (post-traumatic stress disorder)     Since concussion injury 2014    Seasonal

## 2025-06-05 ENCOUNTER — TELEPHONE (OUTPATIENT)
Dept: OBGYN CLINIC | Age: 45
End: 2025-06-05

## 2025-06-05 NOTE — TELEPHONE ENCOUNTER
MANNIE CNM Incoming Phone Call from Current Patient    Patient Request: Patient called stating IUD procedure was unsuccessful, this is confirmed in Jessica's procedure note. Per Jessica in note \" Reviewed likely plan for consult with Dr. Rollins and they may discuss treatment options.\" Patient is calling because she has not heard any update about this or from Dr. Rollins's office. Please advise.       Patient Symptoms: None      Last annual visit: 06/03/24      Last provider visit: 05/22/25         Can this concern wait 24-48hrs to be addressed? Yes            If it cannot wait 24 hours- please route to on call CNM     If patient states if can wait- please route message to CNM that last dealt with this complaint.      * please check CNM schedule to make sure the provider you are routing to is working today!

## 2025-06-11 DIAGNOSIS — N94.6 DYSMENORRHEA: Primary | ICD-10-CM

## 2025-07-24 ENCOUNTER — OFFICE VISIT (OUTPATIENT)
Dept: PAIN MANAGEMENT | Age: 45
End: 2025-07-24
Payer: MEDICAID

## 2025-07-24 VITALS — BODY MASS INDEX: 36.96 KG/M2 | WEIGHT: 230 LBS | HEIGHT: 66 IN

## 2025-07-24 DIAGNOSIS — M47.812 CERVICAL SPONDYLOSIS: ICD-10-CM

## 2025-07-24 DIAGNOSIS — M47.817 LUMBOSACRAL SPONDYLOSIS WITHOUT MYELOPATHY: ICD-10-CM

## 2025-07-24 DIAGNOSIS — M50.30 DEGENERATIVE DISC DISEASE, CERVICAL: ICD-10-CM

## 2025-07-24 DIAGNOSIS — M53.3 SACROILIAC JOINT PAIN: Primary | ICD-10-CM

## 2025-07-24 PROCEDURE — 1036F TOBACCO NON-USER: CPT | Performed by: NURSE PRACTITIONER

## 2025-07-24 PROCEDURE — G8417 CALC BMI ABV UP PARAM F/U: HCPCS | Performed by: NURSE PRACTITIONER

## 2025-07-24 PROCEDURE — 99214 OFFICE O/P EST MOD 30 MIN: CPT | Performed by: NURSE PRACTITIONER

## 2025-07-24 PROCEDURE — G8427 DOCREV CUR MEDS BY ELIG CLIN: HCPCS | Performed by: NURSE PRACTITIONER

## 2025-07-24 ASSESSMENT — ENCOUNTER SYMPTOMS
SHORTNESS OF BREATH: 0
CONSTIPATION: 0
COUGH: 0

## 2025-07-24 NOTE — PROGRESS NOTES
Chief Complaint   Patient presents with    Back Pain        [facility]   Pt complains of neck and back pain. Cervical MRI 3/2024 with multilevel disc herniations, most notably at C5-C6. Large central disc extrusion indents the ventral thecal sac and ventral surface of the spinal cord with questionable cord signal abnormality. There is resultant moderate spinal canal stenosis.  Status post right and left cervical C3, C4, C5 medial branch radiofrequency ablation on 12/10/2024 and 12/31/2024 with 100% improvement in pain and function   She has seen Dr. Escobar and Dr. Hendricks with nothing surgical planned.  Lumbar MRI 6/2024 with no significant spondylotic changes within the lumbar spine.    She underwent a bilateral sacroiliac joint injection on 4/1/2025 with 90% improvement in pain and function. The pain has gradually returned to baseline.   She reports the low back is more painful than the neck at his time.   She is requesting to repeat SI joint injections.     Back Pain  This is a chronic problem. The current episode started more than 1 year ago. The problem occurs intermittently. The problem has been gradually worsening since onset. The pain is present in the lumbar spine. The quality of the pain is described as burning and shooting. The pain does not radiate. The pain is at a severity of 7/10. The pain is moderate. The pain is Worse during the night. The symptoms are aggravated by bending, position, lying down, standing, sitting, coughing and stress. Stiffness is present At night. Pertinent negatives include no chest pain or fever. She has tried analgesics, NSAIDs, heat, ice, home exercises, bed rest, muscle relaxant and walking for the symptoms.   Neck Pain   This is a chronic problem. The current episode started more than 1 year ago. The problem occurs intermittently. The problem has been gradually worsening. Associated with: head injury. The pain is present in the left side. The quality of the pain is described

## 2025-08-19 ENCOUNTER — HOSPITAL ENCOUNTER (OUTPATIENT)
Dept: PAIN MANAGEMENT | Facility: CLINIC | Age: 45
Discharge: HOME | End: 2025-08-19
Payer: MEDICAID

## 2025-08-19 VITALS
HEIGHT: 66 IN | RESPIRATION RATE: 10 BRPM | TEMPERATURE: 97.8 F | BODY MASS INDEX: 36.96 KG/M2 | SYSTOLIC BLOOD PRESSURE: 113 MMHG | WEIGHT: 230 LBS | OXYGEN SATURATION: 98 % | HEART RATE: 73 BPM | DIASTOLIC BLOOD PRESSURE: 71 MMHG

## 2025-08-19 DIAGNOSIS — R52 PAIN MANAGEMENT: ICD-10-CM

## 2025-08-19 PROBLEM — M53.3 SACROILIAC JOINT PAIN: Status: ACTIVE | Noted: 2025-08-19

## 2025-08-19 LAB — HCG, PREGNANCY URINE (POC): NEGATIVE

## 2025-08-19 PROCEDURE — 27096 INJECT SACROILIAC JOINT: CPT | Performed by: STUDENT IN AN ORGANIZED HEALTH CARE EDUCATION/TRAINING PROGRAM

## 2025-08-19 PROCEDURE — 6360000002 HC RX W HCPCS: Performed by: STUDENT IN AN ORGANIZED HEALTH CARE EDUCATION/TRAINING PROGRAM

## 2025-08-19 PROCEDURE — 81025 URINE PREGNANCY TEST: CPT

## 2025-08-19 PROCEDURE — G0260 INJ FOR SACROILIAC JT ANESTH: HCPCS

## 2025-08-19 PROCEDURE — 99152 MOD SED SAME PHYS/QHP 5/>YRS: CPT | Performed by: STUDENT IN AN ORGANIZED HEALTH CARE EDUCATION/TRAINING PROGRAM

## 2025-08-19 RX ORDER — MIDAZOLAM HYDROCHLORIDE 2 MG/2ML
INJECTION, SOLUTION INTRAMUSCULAR; INTRAVENOUS
Status: COMPLETED | OUTPATIENT
Start: 2025-08-19 | End: 2025-08-19

## 2025-08-19 RX ORDER — DEXAMETHASONE SODIUM PHOSPHATE 10 MG/ML
INJECTION, SOLUTION INTRAMUSCULAR; INTRAVENOUS
Status: COMPLETED | OUTPATIENT
Start: 2025-08-19 | End: 2025-08-19

## 2025-08-19 RX ORDER — LIDOCAINE HYDROCHLORIDE 10 MG/ML
INJECTION, SOLUTION EPIDURAL; INFILTRATION; INTRACAUDAL; PERINEURAL
Status: COMPLETED | OUTPATIENT
Start: 2025-08-19 | End: 2025-08-19

## 2025-08-19 RX ADMIN — DEXAMETHASONE SODIUM PHOSPHATE 10 MG: 10 INJECTION INTRAMUSCULAR; INTRAVENOUS at 14:27

## 2025-08-19 RX ADMIN — MIDAZOLAM HYDROCHLORIDE 2 MG: 1 INJECTION, SOLUTION INTRAMUSCULAR; INTRAVENOUS at 14:25

## 2025-08-19 RX ADMIN — LIDOCAINE HYDROCHLORIDE 2 ML: 10 INJECTION, SOLUTION EPIDURAL; INFILTRATION; INTRACAUDAL; PERINEURAL at 14:27

## 2025-08-19 ASSESSMENT — PAIN DESCRIPTION - DESCRIPTORS: DESCRIPTORS: BURNING

## 2025-08-19 ASSESSMENT — PAIN SCALES - GENERAL: PAINLEVEL_OUTOF10: 0

## (undated) DEVICE — ELECTRODE CAUT EXT LESION BALL 1/8IN STD LEN 5.5CM SHFT 5MM

## (undated) DEVICE — ELECTRODE ARTHSCP 15X11MM SHFT 11CM MPLR LOOP GRN DISP W/

## (undated) DEVICE — TUBING, SUCTION, 9/32" X 20', STRAIGHT: Brand: MEDLINE INDUSTRIES, INC.

## (undated) DEVICE — SOLUTION SCRB 4OZ 2% CHG FOR SURG SCRBBING HND WSH

## (undated) DEVICE — SYRINGE,CONTROL,LL,FINGER,GRIP: Brand: MEDLINE INDUSTRIES, INC.

## (undated) DEVICE — GLOVE ORANGE PI 7 1/2   MSG9075

## (undated) DEVICE — YANKAUER,FLEXIBLE HANDLE,REGLR CAPACITY: Brand: MEDLINE INDUSTRIES, INC.

## (undated) DEVICE — DRAPE,REIN 53X77,STERILE: Brand: MEDLINE

## (undated) DEVICE — SVMMC GYN MIN PK

## (undated) DEVICE — CONTAINER,SPECIMEN,4OZ,OR STRL: Brand: MEDLINE

## (undated) DEVICE — NEEDLE, QUINCKE, 18GX3.5": Brand: MEDLINE

## (undated) DEVICE — GARMENT,MEDLINE,DVT,INT,CALF,MED, GEN2: Brand: MEDLINE

## (undated) DEVICE — ELECTRODE PT RET AD L9FT HI MOIST COND ADH HYDRGEL CORDED

## (undated) DEVICE — GAUZE,SPONGE,4"X4",16PLY,XRAY,STRL,LF: Brand: MEDLINE

## (undated) DEVICE — GOWN,SIRUS,NONRNF,SETINSLV,XL,20/CS: Brand: MEDLINE

## (undated) DEVICE — NEPTUNE E-SEP SMOKE EVACUATION PENCIL, COATED, 70MM BLADE, PUSH BUTTON SWITCH: Brand: NEPTUNE E-SEP

## (undated) DEVICE — GLOVE SURG SZ 65 THK91MIL LTX FREE SYN POLYISOPRENE

## (undated) DEVICE — GLOVE SURG SZ 6 THK91MIL LTX FREE SYN POLYISOPRENE ANTI

## (undated) DEVICE — SMOKE EVACUATION TUBING WITH 7/8 IN TO 1/4 IN REDUCER: Brand: BUFFALO FILTER

## (undated) DEVICE — COVER OR TBL W40XL90IN ABSRB STD AND GRIPPY BK SAHARA

## (undated) DEVICE — APPLICATOR FIBER TIP 16 IN CELLULOSE HD SWAB CHEVRON SCPET